# Patient Record
Sex: FEMALE | Race: OTHER | HISPANIC OR LATINO | URBAN - METROPOLITAN AREA
[De-identification: names, ages, dates, MRNs, and addresses within clinical notes are randomized per-mention and may not be internally consistent; named-entity substitution may affect disease eponyms.]

---

## 2021-02-27 VITALS
HEIGHT: 67 IN | WEIGHT: 182.1 LBS | RESPIRATION RATE: 18 BRPM | TEMPERATURE: 98 F | HEART RATE: 97 BPM | SYSTOLIC BLOOD PRESSURE: 153 MMHG | DIASTOLIC BLOOD PRESSURE: 90 MMHG | OXYGEN SATURATION: 100 %

## 2021-02-27 LAB
BASOPHILS # BLD AUTO: 0.01 K/UL — SIGNIFICANT CHANGE UP (ref 0–0.2)
BASOPHILS NFR BLD AUTO: 0.2 % — SIGNIFICANT CHANGE UP (ref 0–2)
EOSINOPHIL # BLD AUTO: 0.16 K/UL — SIGNIFICANT CHANGE UP (ref 0–0.5)
EOSINOPHIL NFR BLD AUTO: 3.1 % — SIGNIFICANT CHANGE UP (ref 0–6)
HCT VFR BLD CALC: 40.9 % — SIGNIFICANT CHANGE UP (ref 34.5–45)
HGB BLD-MCNC: 13.3 G/DL — SIGNIFICANT CHANGE UP (ref 11.5–15.5)
IMM GRANULOCYTES NFR BLD AUTO: 0.2 % — SIGNIFICANT CHANGE UP (ref 0–1.5)
LYMPHOCYTES # BLD AUTO: 1.85 K/UL — SIGNIFICANT CHANGE UP (ref 1–3.3)
LYMPHOCYTES # BLD AUTO: 36.3 % — SIGNIFICANT CHANGE UP (ref 13–44)
MCHC RBC-ENTMCNC: 29.7 PG — SIGNIFICANT CHANGE UP (ref 27–34)
MCHC RBC-ENTMCNC: 32.5 GM/DL — SIGNIFICANT CHANGE UP (ref 32–36)
MCV RBC AUTO: 91.3 FL — SIGNIFICANT CHANGE UP (ref 80–100)
MONOCYTES # BLD AUTO: 0.55 K/UL — SIGNIFICANT CHANGE UP (ref 0–0.9)
MONOCYTES NFR BLD AUTO: 10.8 % — SIGNIFICANT CHANGE UP (ref 2–14)
NEUTROPHILS # BLD AUTO: 2.51 K/UL — SIGNIFICANT CHANGE UP (ref 1.8–7.4)
NEUTROPHILS NFR BLD AUTO: 49.4 % — SIGNIFICANT CHANGE UP (ref 43–77)
NRBC # BLD: 0 /100 WBCS — SIGNIFICANT CHANGE UP (ref 0–0)
PLATELET # BLD AUTO: 306 K/UL — SIGNIFICANT CHANGE UP (ref 150–400)
RBC # BLD: 4.48 M/UL — SIGNIFICANT CHANGE UP (ref 3.8–5.2)
RBC # FLD: 13.1 % — SIGNIFICANT CHANGE UP (ref 10.3–14.5)
WBC # BLD: 5.09 K/UL — SIGNIFICANT CHANGE UP (ref 3.8–10.5)
WBC # FLD AUTO: 5.09 K/UL — SIGNIFICANT CHANGE UP (ref 3.8–10.5)

## 2021-02-27 NOTE — ED ADULT TRIAGE NOTE - OTHER COMPLAINTS
CC of lightheadedness x yesterday. pt was lying on the bed and tried to sit then felt lightheaded. yesterday /89, prior to /98

## 2021-02-28 ENCOUNTER — INPATIENT (INPATIENT)
Facility: HOSPITAL | Age: 71
LOS: 1 days | Discharge: HOME | DRG: 310 | End: 2021-03-02
Attending: INTERNAL MEDICINE | Admitting: INTERNAL MEDICINE
Payer: MEDICARE

## 2021-02-28 DIAGNOSIS — E11.9 TYPE 2 DIABETES MELLITUS WITHOUT COMPLICATIONS: ICD-10-CM

## 2021-02-28 DIAGNOSIS — Z90.710 ACQUIRED ABSENCE OF BOTH CERVIX AND UTERUS: Chronic | ICD-10-CM

## 2021-02-28 DIAGNOSIS — E78.5 HYPERLIPIDEMIA, UNSPECIFIED: ICD-10-CM

## 2021-02-28 DIAGNOSIS — Z96.651 PRESENCE OF RIGHT ARTIFICIAL KNEE JOINT: Chronic | ICD-10-CM

## 2021-02-28 DIAGNOSIS — I48.91 UNSPECIFIED ATRIAL FIBRILLATION: ICD-10-CM

## 2021-02-28 LAB
ALBUMIN SERPL ELPH-MCNC: 4.4 G/DL — SIGNIFICANT CHANGE UP (ref 3.3–5)
ALP SERPL-CCNC: 87 U/L — SIGNIFICANT CHANGE UP (ref 40–120)
ALT FLD-CCNC: 19 U/L — SIGNIFICANT CHANGE UP (ref 10–45)
ANION GAP SERPL CALC-SCNC: 10 MMOL/L — SIGNIFICANT CHANGE UP (ref 5–17)
ANION GAP SERPL CALC-SCNC: 9 MMOL/L — SIGNIFICANT CHANGE UP (ref 5–17)
APTT BLD: 34.6 SEC — SIGNIFICANT CHANGE UP (ref 27.5–35.5)
AST SERPL-CCNC: 23 U/L — SIGNIFICANT CHANGE UP (ref 10–40)
BILIRUB SERPL-MCNC: 0.3 MG/DL — SIGNIFICANT CHANGE UP (ref 0.2–1.2)
BUN SERPL-MCNC: 12 MG/DL — SIGNIFICANT CHANGE UP (ref 7–23)
BUN SERPL-MCNC: 13 MG/DL — SIGNIFICANT CHANGE UP (ref 7–23)
CALCIUM SERPL-MCNC: 10 MG/DL — SIGNIFICANT CHANGE UP (ref 8.4–10.5)
CALCIUM SERPL-MCNC: 11 MG/DL — HIGH (ref 8.4–10.5)
CHLORIDE SERPL-SCNC: 105 MMOL/L — SIGNIFICANT CHANGE UP (ref 96–108)
CHLORIDE SERPL-SCNC: 105 MMOL/L — SIGNIFICANT CHANGE UP (ref 96–108)
CO2 SERPL-SCNC: 24 MMOL/L — SIGNIFICANT CHANGE UP (ref 22–31)
CO2 SERPL-SCNC: 27 MMOL/L — SIGNIFICANT CHANGE UP (ref 22–31)
CREAT SERPL-MCNC: 0.7 MG/DL — SIGNIFICANT CHANGE UP (ref 0.5–1.3)
CREAT SERPL-MCNC: 0.77 MG/DL — SIGNIFICANT CHANGE UP (ref 0.5–1.3)
GLUCOSE BLDC GLUCOMTR-MCNC: 107 MG/DL — HIGH (ref 70–99)
GLUCOSE BLDC GLUCOMTR-MCNC: 112 MG/DL — HIGH (ref 70–99)
GLUCOSE BLDC GLUCOMTR-MCNC: 132 MG/DL — HIGH (ref 70–99)
GLUCOSE SERPL-MCNC: 114 MG/DL — HIGH (ref 70–99)
GLUCOSE SERPL-MCNC: 163 MG/DL — HIGH (ref 70–99)
HCT VFR BLD CALC: 40.3 % — SIGNIFICANT CHANGE UP (ref 34.5–45)
HCV AB S/CO SERPL IA: 0.11 S/CO — SIGNIFICANT CHANGE UP
HCV AB SERPL-IMP: SIGNIFICANT CHANGE UP
HGB BLD-MCNC: 13.3 G/DL — SIGNIFICANT CHANGE UP (ref 11.5–15.5)
INR BLD: 1.31 — HIGH (ref 0.88–1.16)
MAGNESIUM SERPL-MCNC: 1.6 MG/DL — SIGNIFICANT CHANGE UP (ref 1.6–2.6)
MCHC RBC-ENTMCNC: 29.4 PG — SIGNIFICANT CHANGE UP (ref 27–34)
MCHC RBC-ENTMCNC: 33 GM/DL — SIGNIFICANT CHANGE UP (ref 32–36)
MCV RBC AUTO: 89.2 FL — SIGNIFICANT CHANGE UP (ref 80–100)
NRBC # BLD: 0 /100 WBCS — SIGNIFICANT CHANGE UP (ref 0–0)
PLATELET # BLD AUTO: 211 K/UL — SIGNIFICANT CHANGE UP (ref 150–400)
POTASSIUM SERPL-MCNC: 3.8 MMOL/L — SIGNIFICANT CHANGE UP (ref 3.5–5.3)
POTASSIUM SERPL-MCNC: 4.1 MMOL/L — SIGNIFICANT CHANGE UP (ref 3.5–5.3)
POTASSIUM SERPL-SCNC: 3.8 MMOL/L — SIGNIFICANT CHANGE UP (ref 3.5–5.3)
POTASSIUM SERPL-SCNC: 4.1 MMOL/L — SIGNIFICANT CHANGE UP (ref 3.5–5.3)
PROT SERPL-MCNC: 7.3 G/DL — SIGNIFICANT CHANGE UP (ref 6–8.3)
PROTHROM AB SERPL-ACNC: 15.5 SEC — HIGH (ref 10.6–13.6)
RBC # BLD: 4.52 M/UL — SIGNIFICANT CHANGE UP (ref 3.8–5.2)
RBC # FLD: 13.3 % — SIGNIFICANT CHANGE UP (ref 10.3–14.5)
SARS-COV-2 IGG SERPL QL IA: NEGATIVE — SIGNIFICANT CHANGE UP
SARS-COV-2 IGM SERPL IA-ACNC: 0.07 INDEX — SIGNIFICANT CHANGE UP
SARS-COV-2 RNA SPEC QL NAA+PROBE: SIGNIFICANT CHANGE UP
SODIUM SERPL-SCNC: 138 MMOL/L — SIGNIFICANT CHANGE UP (ref 135–145)
SODIUM SERPL-SCNC: 142 MMOL/L — SIGNIFICANT CHANGE UP (ref 135–145)
TROPONIN T SERPL-MCNC: <0.01 NG/ML — SIGNIFICANT CHANGE UP (ref 0–0.01)
TROPONIN T SERPL-MCNC: <0.01 NG/ML — SIGNIFICANT CHANGE UP (ref 0–0.01)
WBC # BLD: 4.63 K/UL — SIGNIFICANT CHANGE UP (ref 3.8–10.5)
WBC # FLD AUTO: 4.63 K/UL — SIGNIFICANT CHANGE UP (ref 3.8–10.5)

## 2021-02-28 PROCEDURE — 71045 X-RAY EXAM CHEST 1 VIEW: CPT | Mod: 26

## 2021-02-28 PROCEDURE — 99222 1ST HOSP IP/OBS MODERATE 55: CPT

## 2021-02-28 PROCEDURE — 70450 CT HEAD/BRAIN W/O DYE: CPT | Mod: 26,MA

## 2021-02-28 PROCEDURE — 99291 CRITICAL CARE FIRST HOUR: CPT

## 2021-02-28 PROCEDURE — 93010 ELECTROCARDIOGRAM REPORT: CPT

## 2021-02-28 RX ORDER — ATORVASTATIN CALCIUM 80 MG/1
1 TABLET, FILM COATED ORAL
Qty: 0 | Refills: 0 | DISCHARGE

## 2021-02-28 RX ORDER — APIXABAN 2.5 MG/1
1 TABLET, FILM COATED ORAL
Qty: 30 | Refills: 2
Start: 2021-02-28 | End: 2021-05-28

## 2021-02-28 RX ORDER — SODIUM CHLORIDE 9 MG/ML
1000 INJECTION, SOLUTION INTRAVENOUS
Refills: 0 | Status: DISCONTINUED | OUTPATIENT
Start: 2021-02-28 | End: 2021-03-02

## 2021-02-28 RX ORDER — METOPROLOL TARTRATE 50 MG
12.5 TABLET ORAL
Refills: 0 | Status: DISCONTINUED | OUTPATIENT
Start: 2021-02-28 | End: 2021-02-28

## 2021-02-28 RX ORDER — APIXABAN 2.5 MG/1
5 TABLET, FILM COATED ORAL EVERY 12 HOURS
Refills: 0 | Status: DISCONTINUED | OUTPATIENT
Start: 2021-02-28 | End: 2021-03-02

## 2021-02-28 RX ORDER — DEXTROSE 50 % IN WATER 50 %
12.5 SYRINGE (ML) INTRAVENOUS ONCE
Refills: 0 | Status: DISCONTINUED | OUTPATIENT
Start: 2021-02-28 | End: 2021-03-02

## 2021-02-28 RX ORDER — DEXTROSE 50 % IN WATER 50 %
25 SYRINGE (ML) INTRAVENOUS ONCE
Refills: 0 | Status: DISCONTINUED | OUTPATIENT
Start: 2021-02-28 | End: 2021-03-02

## 2021-02-28 RX ORDER — MAGNESIUM SULFATE 500 MG/ML
2 VIAL (ML) INJECTION ONCE
Refills: 0 | Status: COMPLETED | OUTPATIENT
Start: 2021-02-28 | End: 2021-02-28

## 2021-02-28 RX ORDER — GLUCAGON INJECTION, SOLUTION 0.5 MG/.1ML
1 INJECTION, SOLUTION SUBCUTANEOUS ONCE
Refills: 0 | Status: DISCONTINUED | OUTPATIENT
Start: 2021-02-28 | End: 2021-03-02

## 2021-02-28 RX ORDER — APIXABAN 2.5 MG/1
5 TABLET, FILM COATED ORAL EVERY 12 HOURS
Refills: 0 | Status: DISCONTINUED | OUTPATIENT
Start: 2021-02-28 | End: 2021-02-28

## 2021-02-28 RX ORDER — METOPROLOL TARTRATE 50 MG
12.5 TABLET ORAL ONCE
Refills: 0 | Status: COMPLETED | OUTPATIENT
Start: 2021-02-28 | End: 2021-02-28

## 2021-02-28 RX ORDER — METOPROLOL TARTRATE 50 MG
5 TABLET ORAL ONCE
Refills: 0 | Status: COMPLETED | OUTPATIENT
Start: 2021-02-28 | End: 2021-02-28

## 2021-02-28 RX ORDER — ATORVASTATIN CALCIUM 80 MG/1
40 TABLET, FILM COATED ORAL AT BEDTIME
Refills: 0 | Status: DISCONTINUED | OUTPATIENT
Start: 2021-02-28 | End: 2021-03-02

## 2021-02-28 RX ORDER — DEXTROSE 50 % IN WATER 50 %
15 SYRINGE (ML) INTRAVENOUS ONCE
Refills: 0 | Status: DISCONTINUED | OUTPATIENT
Start: 2021-02-28 | End: 2021-03-02

## 2021-02-28 RX ORDER — DILTIAZEM HCL 120 MG
20 CAPSULE, EXT RELEASE 24 HR ORAL ONCE
Refills: 0 | Status: DISCONTINUED | OUTPATIENT
Start: 2021-02-28 | End: 2021-02-28

## 2021-02-28 RX ORDER — METOPROLOL TARTRATE 50 MG
25 TABLET ORAL
Refills: 0 | Status: DISCONTINUED | OUTPATIENT
Start: 2021-02-28 | End: 2021-03-01

## 2021-02-28 RX ORDER — POTASSIUM CHLORIDE 20 MEQ
20 PACKET (EA) ORAL ONCE
Refills: 0 | Status: COMPLETED | OUTPATIENT
Start: 2021-02-28 | End: 2021-02-28

## 2021-02-28 RX ORDER — APIXABAN 2.5 MG/1
5 TABLET, FILM COATED ORAL ONCE
Refills: 0 | Status: COMPLETED | OUTPATIENT
Start: 2021-02-28 | End: 2021-02-28

## 2021-02-28 RX ORDER — METFORMIN HYDROCHLORIDE 850 MG/1
1 TABLET ORAL
Qty: 0 | Refills: 0 | DISCHARGE

## 2021-02-28 RX ORDER — INSULIN LISPRO 100/ML
VIAL (ML) SUBCUTANEOUS
Refills: 0 | Status: DISCONTINUED | OUTPATIENT
Start: 2021-02-28 | End: 2021-03-02

## 2021-02-28 RX ADMIN — APIXABAN 5 MILLIGRAM(S): 2.5 TABLET, FILM COATED ORAL at 14:02

## 2021-02-28 RX ADMIN — Medication 12.5 MILLIGRAM(S): at 11:01

## 2021-02-28 RX ADMIN — ATORVASTATIN CALCIUM 40 MILLIGRAM(S): 80 TABLET, FILM COATED ORAL at 21:06

## 2021-02-28 RX ADMIN — Medication 50 GRAM(S): at 12:10

## 2021-02-28 RX ADMIN — Medication 5 MILLIGRAM(S): at 00:47

## 2021-02-28 RX ADMIN — APIXABAN 5 MILLIGRAM(S): 2.5 TABLET, FILM COATED ORAL at 02:17

## 2021-02-28 RX ADMIN — Medication 20 MILLIEQUIVALENT(S): at 01:27

## 2021-02-28 RX ADMIN — Medication 25 MILLIGRAM(S): at 17:23

## 2021-02-28 RX ADMIN — Medication 12.5 MILLIGRAM(S): at 06:18

## 2021-02-28 NOTE — ED PROVIDER NOTE - CLINICAL SUMMARY MEDICAL DECISION MAKING FREE TEXT BOX
Pt c/o dizziness yest (now resolved), intermittent palpitation sensation, sob ~ 9p w/o cp.  Pt w new afib.  Neuro exam nl, low concern for cva but + afib, dizziness w/o neuro deficits.

## 2021-02-28 NOTE — ED PROVIDER NOTE - NEUROLOGICAL, MLM
awake, alert, oriented x 3, CN II-XII grossly intact, motor 5/5, no gross sens deficits,  no ataxia, speech clear.

## 2021-02-28 NOTE — H&P ADULT - NSHPLABSRESULTS_GEN_ALL_CORE
13.3   5.09  )-----------( 306      ( 27 Feb 2021 23:43 )             40.9       02-27    142  |  105  |  13  ----------------------------<  114<H>  3.8   |  27  |  0.70    Ca    11.0<H>      27 Feb 2021 23:43    TPro  7.3  /  Alb  4.4  /  TBili  0.3  /  DBili  x   /  AST  23  /  ALT  19  /  AlkPhos  87  02-27          CARDIAC MARKERS ( 27 Feb 2021 23:43 )  x     / <0.01 ng/mL / x     / x     / x                EKG:

## 2021-02-28 NOTE — H&P ADULT - PROBLEM SELECTOR PLAN 3
Total 110, try 82, HDL 44, LDL 50  -continue home....      DVT PPX: Eliquis   Dispo: pending clinical progression Total 110, try 82, HDL 44, LDL 50  -continue home atorvastatin 40 mg QHS       DVT PPX: Eliquis   Dispo: pending clinical progression

## 2021-02-28 NOTE — H&P ADULT - ATTENDING COMMENTS
Initial attending contact date 2/28/21. See attending addendum to HPI for initial attending contact documentation. See PA note written above, for details. I reviewed the PA documentation.  I have personally seen and examined this patient today. I reviewed vitals, labs, medications, cardiac studies and additional imaging.  I agree with the PA's findings and plans as written above with the following additions/amendments:  patient with new onset Afib, HLD, DM  Plan for:  Eliquis 5 BID for new onset Afib  Send rx to [facility] to check coverage  High intensity statin Atorva 40mg qHS   Metoprolol 25 BID for rate control  -->will transition to XL upon discharge  Obtain TTE for cardiac structural assessment  EP consult to feroz for DCCV   NPO pMN tonight in anticipation for potential EP procedure (e.g. SARA/DCCV)  CARLITOS Harris.  Cardiology Attending

## 2021-02-28 NOTE — H&P ADULT - NSICDXPASTMEDICALHX_GEN_ALL_CORE_FT
PAST MEDICAL HISTORY:  Diabetes     HLD (hyperlipidemia)      PAST MEDICAL HISTORY:  Diabetes     HLD (hyperlipidemia)     Thyroid nodule

## 2021-02-28 NOTE — ED PROVIDER NOTE - OBJECTIVE STATEMENT
71 yo female h/o hld, dm c/o episode of dizziness (lh sensation) yest that resolved, bp 150's yest that was then 130's on repeat, bp 200's earlier today that also improved, palpitation sensation earlier today that resolved, sob starting ~ 9pm tonight that's now improved.  No cp.  No current palpitations, sob, dizziness.  No h/o cad, arrhythmia.  No ha, change in vision/speech/gait, numbness or weakness in ext   No fever, uri sx.

## 2021-02-28 NOTE — ED PROVIDER NOTE - PROGRESS NOTE DETAILS
Pt discussed w cardiology fellow and pa.  Agrees w admission.  Cardiology prefers metoprolol for rate control. ER Physician:  Ashlie Director  CHEST XRAY INTERPRETATION: lungs clear, heart shadow normal, bony structures intact

## 2021-02-28 NOTE — H&P ADULT - ASSESSMENT
70 Y F with PMH HLD, DM II who presents to ED 2/28/21 for an episode of palpitations, dizziness and SOB at 9pm Friday when in bed. Pt took an aspirin 81 mg x1 and symptoms resolved after a few minutes.  EKG: afib @ 116 bpm.  s/p lopressor 5 mg IV x1 with resulting HR 80-90's and Eliquis 5 mg po X1.  Pt admitted to cardiac tele for management of new afib.

## 2021-02-28 NOTE — ED ADULT NURSE NOTE - OBJECTIVE STATEMENT
71 y/o female w/ PMH DMII and HLD, presents to ED for c/o intermittent dizziness and episodes of high blood pressure since yesterday, highest reading 200s. Pt also reports associated chest palpitations 71 y/o female w/ PMH DMII and HLD, presents to ED for c/o intermittent dizziness and episodes of high blood pressure since yesterday, highest reading 200s. Pt also reports associated chest palpitations and SOB that began this evening, ultimately the reason why pt presented to ED. Denies CP on arrival, reports mild associated headaches. Denies cough/fevers/chills/difficulty breathing. NAD on arrival, afib on EKG

## 2021-03-01 ENCOUNTER — TRANSCRIPTION ENCOUNTER (OUTPATIENT)
Age: 71
End: 2021-03-01

## 2021-03-01 LAB
ANION GAP SERPL CALC-SCNC: 8 MMOL/L — SIGNIFICANT CHANGE UP (ref 5–17)
APTT BLD: 33.5 SEC — SIGNIFICANT CHANGE UP (ref 27.5–35.5)
BUN SERPL-MCNC: 17 MG/DL — SIGNIFICANT CHANGE UP (ref 7–23)
CALCIUM SERPL-MCNC: 9.4 MG/DL — SIGNIFICANT CHANGE UP (ref 8.4–10.5)
CHLORIDE SERPL-SCNC: 107 MMOL/L — SIGNIFICANT CHANGE UP (ref 96–108)
CO2 SERPL-SCNC: 28 MMOL/L — SIGNIFICANT CHANGE UP (ref 22–31)
CREAT SERPL-MCNC: 0.91 MG/DL — SIGNIFICANT CHANGE UP (ref 0.5–1.3)
GLUCOSE BLDC GLUCOMTR-MCNC: 121 MG/DL — HIGH (ref 70–99)
GLUCOSE BLDC GLUCOMTR-MCNC: 133 MG/DL — HIGH (ref 70–99)
GLUCOSE BLDC GLUCOMTR-MCNC: 143 MG/DL — HIGH (ref 70–99)
GLUCOSE BLDC GLUCOMTR-MCNC: 153 MG/DL — HIGH (ref 70–99)
GLUCOSE BLDC GLUCOMTR-MCNC: 173 MG/DL — HIGH (ref 70–99)
GLUCOSE SERPL-MCNC: 123 MG/DL — HIGH (ref 70–99)
HCT VFR BLD CALC: 42.5 % — SIGNIFICANT CHANGE UP (ref 34.5–45)
HGB BLD-MCNC: 13.7 G/DL — SIGNIFICANT CHANGE UP (ref 11.5–15.5)
INR BLD: 1.48 — HIGH (ref 0.88–1.16)
MAGNESIUM SERPL-MCNC: 2 MG/DL — SIGNIFICANT CHANGE UP (ref 1.6–2.6)
MCHC RBC-ENTMCNC: 29.6 PG — SIGNIFICANT CHANGE UP (ref 27–34)
MCHC RBC-ENTMCNC: 32.2 GM/DL — SIGNIFICANT CHANGE UP (ref 32–36)
MCV RBC AUTO: 91.8 FL — SIGNIFICANT CHANGE UP (ref 80–100)
NRBC # BLD: 0 /100 WBCS — SIGNIFICANT CHANGE UP (ref 0–0)
PLATELET # BLD AUTO: 333 K/UL — SIGNIFICANT CHANGE UP (ref 150–400)
POTASSIUM SERPL-MCNC: 4.6 MMOL/L — SIGNIFICANT CHANGE UP (ref 3.5–5.3)
POTASSIUM SERPL-SCNC: 4.6 MMOL/L — SIGNIFICANT CHANGE UP (ref 3.5–5.3)
PROTHROM AB SERPL-ACNC: 17.4 SEC — HIGH (ref 10.6–13.6)
RBC # BLD: 4.63 M/UL — SIGNIFICANT CHANGE UP (ref 3.8–5.2)
RBC # FLD: 13.3 % — SIGNIFICANT CHANGE UP (ref 10.3–14.5)
SODIUM SERPL-SCNC: 143 MMOL/L — SIGNIFICANT CHANGE UP (ref 135–145)
WBC # BLD: 4.5 K/UL — SIGNIFICANT CHANGE UP (ref 3.8–10.5)
WBC # FLD AUTO: 4.5 K/UL — SIGNIFICANT CHANGE UP (ref 3.8–10.5)

## 2021-03-01 PROCEDURE — 99223 1ST HOSP IP/OBS HIGH 75: CPT

## 2021-03-01 PROCEDURE — 92960 CARDIOVERSION ELECTRIC EXT: CPT

## 2021-03-01 PROCEDURE — 93312 ECHO TRANSESOPHAGEAL: CPT | Mod: 26

## 2021-03-01 PROCEDURE — 99233 SBSQ HOSP IP/OBS HIGH 50: CPT

## 2021-03-01 RX ORDER — METOPROLOL TARTRATE 50 MG
50 TABLET ORAL DAILY
Refills: 0 | Status: DISCONTINUED | OUTPATIENT
Start: 2021-03-01 | End: 2021-03-01

## 2021-03-01 RX ORDER — METOPROLOL TARTRATE 50 MG
50 TABLET ORAL DAILY
Refills: 0 | Status: DISCONTINUED | OUTPATIENT
Start: 2021-03-02 | End: 2021-03-02

## 2021-03-01 RX ORDER — APIXABAN 2.5 MG/1
1 TABLET, FILM COATED ORAL
Qty: 30 | Refills: 0
Start: 2021-03-01 | End: 2021-03-30

## 2021-03-01 RX ORDER — APIXABAN 2.5 MG/1
1 TABLET, FILM COATED ORAL
Qty: 60 | Refills: 0
Start: 2021-03-01 | End: 2021-03-30

## 2021-03-01 RX ORDER — METOPROLOL TARTRATE 50 MG
5 TABLET ORAL ONCE
Refills: 0 | Status: COMPLETED | OUTPATIENT
Start: 2021-03-01 | End: 2021-03-01

## 2021-03-01 RX ORDER — LANOLIN ALCOHOL/MO/W.PET/CERES
5 CREAM (GRAM) TOPICAL AT BEDTIME
Refills: 0 | Status: DISCONTINUED | OUTPATIENT
Start: 2021-03-01 | End: 2021-03-02

## 2021-03-01 RX ORDER — METOPROLOL TARTRATE 50 MG
25 TABLET ORAL ONCE
Refills: 0 | Status: COMPLETED | OUTPATIENT
Start: 2021-03-01 | End: 2021-03-01

## 2021-03-01 RX ADMIN — Medication 25 MILLIGRAM(S): at 21:37

## 2021-03-01 RX ADMIN — Medication 25 MILLIGRAM(S): at 15:34

## 2021-03-01 RX ADMIN — APIXABAN 5 MILLIGRAM(S): 2.5 TABLET, FILM COATED ORAL at 01:01

## 2021-03-01 RX ADMIN — Medication 5 MILLIGRAM(S): at 21:37

## 2021-03-01 RX ADMIN — APIXABAN 5 MILLIGRAM(S): 2.5 TABLET, FILM COATED ORAL at 15:34

## 2021-03-01 RX ADMIN — Medication 1: at 21:37

## 2021-03-01 RX ADMIN — ATORVASTATIN CALCIUM 40 MILLIGRAM(S): 80 TABLET, FILM COATED ORAL at 21:38

## 2021-03-01 NOTE — DISCHARGE NOTE PROVIDER - NSDCCPTREATMENT_GEN_ALL_CORE_FT
PRINCIPAL PROCEDURE  Procedure: 2D echocardiography  Findings and Treatment: CONCLUSIONS:   1. Normal left and right ventricular size and systolic function.   2. No LA/RA/WAI/RAA thrombus seen.   3. Mild mitral regurgitation.   4. The aortic valve is tricuspid and mildly calcified. There is mild aortic regurgitation.   5. No pericardial effusion.   6. There is mild non-mobile plaque seen in the visualized portions of the descending aorta and aortic arch.

## 2021-03-01 NOTE — DISCHARGE NOTE PROVIDER - CARE PROVIDER_API CALL
Anant Isidro (MD)  Cardiac Electrophysiology; Cardiovascular Disease; Internal Medicine  [address] [place] [address]  Phone: [phone]  Fax: [phone]  Follow Up Time: 1 month   Anant Isidro (MD)  Cardiac Electrophysiology; Cardiovascular Disease; Internal Medicine  [address] [place] [address], [address] [place] [address]  Phone: [phone]  Fax: [phone]  Follow Up Time: 1 month    Inocente Rangel)  Cardiology  [address]   Phone: [phone]  Fax: [phone]  Follow Up Time: 2 weeks   Anant Isidro (MD)  Cardiac Electrophysiology; Cardiovascular Disease; Internal Medicine  [address], [address] [place] [address]  Phone: [phone]  Fax: [phone]  Scheduled Appointment: 04/29/2021 03:40 PM    Inocente Rangel)  Cardiology  [address]   Phone: [phone]  Fax: [phone]  Follow Up Time: 2 weeks

## 2021-03-01 NOTE — PROGRESS NOTE ADULT - ASSESSMENT
70 Y F with PMH HLD, DM II who presents to ED 2/28/21 for an episode of palpitations, dizziness and SOB at 9pm Friday when in bed. Pt took an aspirin 81 mg x1 and symptoms resolved after a few minutes.  EKG: afib @ 116 bpm.  s/p lopressor 5 mg IV x1 with resulting HR 80-90's and Eliquis 5 mg po X1.  Pt admitted to cardiac tele for management of new afib. 
70 Y F with PMH HLD, DM II who presents to ED 2/28/21 for episodes of palpitations, dizziness and SOB at 9pm Friday when in bed. Pt took an aspirin 81 mg x1 and symptoms resolved after a few minutes. Found to have new onset Afib RVR w/  bpm, started on Eliquis and BB. Pt admitted to cardiac tele for management of new onset Afib, EP consulted s/p SARA/DCCV to NSR.

## 2021-03-01 NOTE — DISCHARGE NOTE PROVIDER - NSDCFUSCHEDAPPT_GEN_ALL_CORE_FT
YASMANY MINER ; 04/29/2021 ; [facility] [address]  YASMANY MINER ; 05/06/2021 ; [facility] [address]

## 2021-03-01 NOTE — CONSULT NOTE ADULT - ASSESSMENT
Sharee Maldonado is a 71 yo F with hx of HTN, HLD, DM who presented with palpitations, dizziness on 2/27 found to be in Atrial fibrillation, Patient started on Elqiuis and metoprolol. EP consulted for cardioversion.    Atrial fibrillation  - continue metoprolol 25mg BID  - continue Eliquis 5mg q 12  - keep NPO, obtain SARA to rule out Thrombus  - obtain TTE to evaluate function  - plan for DCCV once cleared by SARA  - consent obtained.

## 2021-03-01 NOTE — PROGRESS NOTE ADULT - PROBLEM SELECTOR PLAN 1
New onset Afib, -120's  -Transition Lopressor to Toprol 50mg qd  -Started Eliquis 5 mg BID ($4 copay per pharmacy)  -TSH WNL  -SARA revealed EF 65%, mild AI/MR

## 2021-03-01 NOTE — PROGRESS NOTE ADULT - PROBLEM SELECTOR PLAN 3
Total 110, try 82, HDL 44, LDL 50  -continue home atorvastatin 40 mg QHS     DVT PPX: Eliquis   Dispo: No escort home today s/p DCCV. Plan for discharge home tomorrow, friend to

## 2021-03-01 NOTE — DISCHARGE NOTE PROVIDER - NSDCCPCAREPLAN_GEN_ALL_CORE_FT
PRINCIPAL DISCHARGE DIAGNOSIS  Diagnosis: Atrial fibrillation, new onset  Assessment and Plan of Treatment: Atrial fibrillation, new onset       PRINCIPAL DISCHARGE DIAGNOSIS  Diagnosis: Atrial fibrillation, new onset  Assessment and Plan of Treatment: You came into the hospital and was found to have with an irregular heart rhythm called new onset Atrial Fibrillation. You were given medication Metoprolol to help decrease your fast heart rates associated with the atrial fibrillation. You were seen by the Heart Rhythm Specialist (Electrophysiologist) and received a SARA and Cardioversion procedure and was converted into a normal heart rhythm. You were started on blood thinner medication Eliquis for which you will need to take for at least 1 month after the cardioversion procedure to prevent risk of stroke. Please follow up with Dr Isidro in 1 month after hospital discharge.

## 2021-03-01 NOTE — PROGRESS NOTE ADULT - SUBJECTIVE AND OBJECTIVE BOX
*** IN PROGRESS / INCOMPLETE NOTE ***    Interventional Cardiology PA Adult Progress Note    Subjective Assessment:  	  MEDICATIONS:  metoprolol tartrate 12.5 milliGRAM(s) Oral two times a day  atorvastatin 40 milliGRAM(s) Oral at bedtime  dextrose 40% Gel 15 Gram(s) Oral once  dextrose 50% Injectable 25 Gram(s) IV Push once  dextrose 50% Injectable 12.5 Gram(s) IV Push once  dextrose 50% Injectable 25 Gram(s) IV Push once  glucagon  Injectable 1 milliGRAM(s) IntraMuscular once  insulin lispro (ADMELOG) corrective regimen sliding scale   SubCutaneous three times a day before meals  apixaban 5 milliGRAM(s) Oral every 12 hours  dextrose 5%. 1000 milliLiter(s) IV Continuous <Continuous>  dextrose 5%. 1000 milliLiter(s) IV Continuous <Continuous>      	    [PHYSICAL EXAM:  TELEMETRY:  T(C): 37 (02-28-21 @ 04:48), Max: 37 (02-28-21 @ 04:48)  HR: 96 (02-28-21 @ 05:36) (82 - 132)  BP: 123/72 (02-28-21 @ 05:36) (123/68 - 153/90)  RR: 16 (02-28-21 @ 05:36) (16 - 20)  SpO2: 100% (02-28-21 @ 05:36) (100% - 100%)  Wt(kg): --  I&O's Summary    Height (cm): 170.2 (02-28 @ 05:36)  Weight (kg): 82.6 (02-28 @ 05:36)  BMI (kg/m2): 28.5 (02-28 @ 05:36)  BSA (m2): 1.94 (02-28 @ 05:36)  Melgoza:  Central/PICC/Mid Line:                                         Appearance: Normal	  HEENT:   Normal oral mucosa, PERRL, EOMI	  Neck: Supple, + JVD/ - JVD; Carotid Bruit   Cardiovascular: Normal S1 S2, No JVD, No murmurs,   Respiratory: Lungs clear to auscultation/Decreased Breath Sounds/No Rales, Rhonchi, Wheezing	  Gastrointestinal:  Soft, Non-tender, + BS	  Skin: No rashes, No ecchymoses, No cyanosis  Extremities: Normal range of motion, No clubbing, cyanosis or edema  Vascular: Peripheral pulses palpable 2+ bilaterally  Neurologic: Non-focal  Psychiatry: A & O x 3, Mood & affect appropriate      	    ECG:  	  RADIOLOGY:   DIAGNOSTIC TESTING:  [ ] Echocardiogram:  [ ]  Catheterization:  [ ] Stress Test:    [ ] SARA  OTHER: 	    LABS:	 	  CARDIAC MARKERS:                        13.3   5.09  )-----------( 306      ( 27 Feb 2021 23:43 )             40.9     142  |  105  |  13  ----------------------------<  114<H>  3.8   |  27  |  0.70    Ca    11.0<H>      27 Feb 2021 23:43    TPro  7.3  /  Alb  4.4  /  TBili  0.3  /  DBili  x   /  AST  23  /  ALT  19  /  AlkPhos  87  02-27    proBNP:   Lipid Profile:   HgA1c:   TSH: Thyroid Stimulating Hormone, Serum: 1.487 uIU/mL (02-27 @ 23:43)    PT/INR - ( 28 Feb 2021 06:03 )   PT: 15.5 sec;   INR: 1.31          PTT - ( 28 Feb 2021 06:03 )  PTT:34.6 sec    ASSESSMENT/PLAN: 	        DVT ppx:  Dispo:    
CARDIOLOGY NP PROGRESS NOTE    Subjective: Pt seen and examined at bedside. Reports feeling much better after DCCV to NSR today. Denies further chest tightness, chest pain, sob, lightheadedness, dizziness, palpitations. Remainder ROS otherwise negative.    Overnight Events: NPO s/p MN, s/p SARA/DCCV to NSR    TELEMETRY: Afib 90-100s converted to SR 70s      VITAL SIGNS:  T(C): 36.6 (03-01-21 @ 04:30), Max: 37.3 (02-28-21 @ 18:00)  HR: 93 (03-01-21 @ 08:30) (78 - 115)  BP: 112/72 (03-01-21 @ 08:30) (98/57 - 117/67)  RR: 16 (03-01-21 @ 08:30) (16 - 18)  SpO2: 99% (03-01-21 @ 08:30) (96% - 100%)  Wt(kg): --    I&O's Summary    28 Feb 2021 07:01  -  01 Mar 2021 07:00  --------------------------------------------------------  IN: 736 mL / OUT: 750 mL / NET: -14 mL          PHYSICAL EXAM:    General: A/ox 3, No acute Distress  Neck: Supple, NO JVD  Cardiac: S1 S2, No M/R/G  Pulmonary: CTAB, Breathing unlabored on RA, No Rhonchi/Rales/Wheezing  Abdomen: Soft, Non -tender, +BS x 4 quads  Extremities: No Rashes, No edema  Neuro: A/o x 3, No focal deficits          LABS:                          13.7   4.50  )-----------( 333      ( 01 Mar 2021 06:14 )             42.5                              03-01    143  |  107  |  17  ----------------------------<  123<H>  4.6   |  28  |  0.91    Ca    9.4      01 Mar 2021 06:14  Mg     2.0     03-01    TPro  7.3  /  Alb  4.4  /  TBili  0.3  /  DBili  x   /  AST  23  /  ALT  19  /  AlkPhos  87  02-27    LIVER FUNCTIONS - ( 27 Feb 2021 23:43 )  Alb: 4.4 g/dL / Pro: 7.3 g/dL / ALK PHOS: 87 U/L / ALT: 19 U/L / AST: 23 U/L / GGT: x         PT/INR - ( 01 Mar 2021 06:14 )   PT: 17.4 sec;   INR: 1.48          PTT - ( 01 Mar 2021 06:14 )  PTT:33.5 sec  CAPILLARY BLOOD GLUCOSE      POCT Blood Glucose.: 153 mg/dL (01 Mar 2021 14:52)  POCT Blood Glucose.: 121 mg/dL (01 Mar 2021 09:30)  POCT Blood Glucose.: 143 mg/dL (01 Mar 2021 06:46)  POCT Blood Glucose.: 112 mg/dL (28 Feb 2021 17:00)    CARDIAC MARKERS ( 28 Feb 2021 10:09 )  x     / <0.01 ng/mL / x     / x     / x      CARDIAC MARKERS ( 27 Feb 2021 23:43 )  x     / <0.01 ng/mL / x     / x     / x              Allergies:  No Known Allergies    MEDICATIONS  (STANDING):  apixaban 5 milliGRAM(s) Oral every 12 hours  atorvastatin 40 milliGRAM(s) Oral at bedtime  dextrose 40% Gel 15 Gram(s) Oral once  dextrose 5%. 1000 milliLiter(s) (50 mL/Hr) IV Continuous <Continuous>  dextrose 5%. 1000 milliLiter(s) (100 mL/Hr) IV Continuous <Continuous>  dextrose 50% Injectable 25 Gram(s) IV Push once  dextrose 50% Injectable 12.5 Gram(s) IV Push once  dextrose 50% Injectable 25 Gram(s) IV Push once  glucagon  Injectable 1 milliGRAM(s) IntraMuscular once  insulin lispro (ADMELOG) corrective regimen sliding scale   SubCutaneous Before meals and at bedtime  metoprolol tartrate 25 milliGRAM(s) Oral two times a day    MEDICATIONS  (PRN):        DIAGNOSTIC TESTS:     < from: SARA Echo Doppler w/ Cont (03.01.21 @ 10:45) >  CONCLUSIONS:     1. Normal left and right ventricular size and systolic function.   2. No LA/RA/WAI/RAA thrombus seen.   3. Mild mitral regurgitation.   4. The aortic valve is tricuspid and mildly calcified. There is mild aortic regurgitation.   5. No pericardial effusion.   6. There is mild non-mobile plaque seen in the visualized portions of the descending aorta and aortic arch.    < end of copied text >

## 2021-03-01 NOTE — CONSULT NOTE ADULT - SUBJECTIVE AND OBJECTIVE BOX
Sharee Maldonado is a 71 yo F with hx of HTN, HLD, DMII who presented to ED on 2/27 with atrial fibrillation.  On Thursday 2/25 pt began to feel palpitations, nausea and contiued to have dizziness, SOB Friday evening.  Pt took an aspirin 81mg and symptoms started to resolved after a few minutes.  Pt noted blood pressure ranging 130-150s with high 200, which prompted pt to go to ED.   In ED EKG with  Afib, /90, sating 100% on RA, Covid negative, patient was given   5mg IV lopressor which lowered HR to 80-90s. Patient admitted to [address] [place] [address].  Started on metoprolol 25mg BID and eliquis 5mg q 12.    Patient remains in Afib, HR in 80-90s mostly, intermittently up to 120-140s.   BP stable in 110s.  Remains on RA.  Upon exam, patient complains of palpitations, but states that dizziness and nausea have improved.      PAST MEDICAL & SURGICAL HISTORY:  Thyroid nodule    Diabetes    HLD (hyperlipidemia)    H/O total hysterectomy    H/O total knee replacement, right    Appearance: Normal	  HEENT:   Normal oral mucosa  Neck: Supple, -JVD  Cardiovascular:  irregularly irregular   Respiratory: Lungs clear to auscultation  Gastrointestinal:  Soft, Non-tender, + BS	  Skin: No rashes, No ecchymoses, No cyanosis  Extremities: BLE warm, dry, no edema  Psychiatry: A & O x 3, Mood & affect appropriate    ICU Vital Signs Last 24 Hrs  T(C): 36.6 (01 Mar 2021 04:30), Max: 37.3 (28 Feb 2021 18:00)  T(F): 97.8 (01 Mar 2021 04:30), Max: 99.1 (28 Feb 2021 18:00)  HR: 93 (01 Mar 2021 08:30) (78 - 115)  BP: 112/72 (01 Mar 2021 08:30) (98/57 - 117/67)  BP(mean): 89 (01 Mar 2021 08:30) (71 - 89)  ABP: --  ABP(mean): --  RR: 16 (01 Mar 2021 08:30) (16 - 18)  SpO2: 99% (01 Mar 2021 08:30) (96% - 100%)    MEDICATIONS  (STANDING):  apixaban 5 milliGRAM(s) Oral every 12 hours  atorvastatin 40 milliGRAM(s) Oral at bedtime  dextrose 40% Gel 15 Gram(s) Oral once  dextrose 5%. 1000 milliLiter(s) (50 mL/Hr) IV Continuous <Continuous>  dextrose 5%. 1000 milliLiter(s) (100 mL/Hr) IV Continuous <Continuous>  dextrose 50% Injectable 25 Gram(s) IV Push once  dextrose 50% Injectable 12.5 Gram(s) IV Push once  dextrose 50% Injectable 25 Gram(s) IV Push once  glucagon  Injectable 1 milliGRAM(s) IntraMuscular once  insulin lispro (ADMELOG) corrective regimen sliding scale   SubCutaneous Before meals and at bedtime  metoprolol tartrate 25 milliGRAM(s) Oral two times a day    I&O's Detail    28 Feb 2021 07:01  -  01 Mar 2021 07:00  --------------------------------------------------------  IN:    IV PiggyBack: 100 mL    Oral Fluid: 636 mL  Total IN: 736 mL    OUT:    Voided (mL): 750 mL  Total OUT: 750 mL    Total NET: -14 mL

## 2021-03-01 NOTE — PROGRESS NOTE ADULT - ATTENDING COMMENTS
See NP note written above, for details. I reviewed the NP documentation.  I have personally seen and examined this patient today.  I reviewed vitals, labs, medications, cardiac studies and additional imaging.  I agree with the NP findings and plans as written above with the following additions/amendments:  patient with new onset Afib, HLD, DM. Patient now s/p SARA and successful DCCV to NSR with EP. LVEF 65%, no WAI thrombus.   Plan for:  Eliquis 5 BID for new onset Afib  -->$4/mo co pay per [facility]  High intensity statin Atorva 40mg qHS   Metoprolol 25 BID for rate control  -->will transition to 50XL upon discharge  Anticipate discharge 3/2 pending clinical stability post DCCV, friend will  at 12noon  CARLITOS Harris.  Cardiology Attending

## 2021-03-01 NOTE — DISCHARGE NOTE PROVIDER - HOSPITAL COURSE
70 Y F with PMH HLD, DM II who presents to ED 2/28/21 for episodes of palpitations, dizziness and SOB at 9pm Friday when in bed. Pt took an aspirin 81 mg x1 and symptoms resolved after a few minutes. Found to have new onset Afib RVR w/  bpm, started on Eliquis and BB. Pt admitted to cardiac tele for management of new onset Afib, EP consulted s/p SARA/DCCV to NSR. 70 Y F with PMH HLD, DM II who presents to ED 2/28/21 for few episodes of palpitations, dizziness and SOB at rest. Found to have new onset Afib RVR w/  bpm. Pt admitted to cardiac tele for management of new onset Afib. EP was consulted and SARA showed EF 65%, no thrombus, mild AI/MR. S/p successful DCCV on 3/1/21 to NSR 70bpm w/ resolution of symptoms. Pt was started on Eliquis 5mg BID ($4 copay per pharmacy), Lopressor 25mg BID was transitioned to PO Toprol 50mg qd.    On the day of discharge, the patient was seen and examined. Symptoms improved. Vital signs are stable. Labs and imaging reviewed. Patient is medically optimized and hemodynamically stable. Return precautions discussed, medication teach back done, and importance of physician followup emphasized. The patient verbalized understanding. 70 Y F with PMH HLD, DM II who presents to ED 2/28/21 for few episodes of palpitations, dizziness and SOB at rest for the last few days. Found to have new onset Afib RVR w/  bpm. Pt admitted to cardiac tele for management of new onset Afib. EP was consulted and SARA showed EF 65%, no thrombus, mild AI/MR. S/p successful DCCV on 3/1/21 to NSR 70bpm w/ resolution of symptoms. Pt was started on Eliquis 5mg BID ($4 copay per pharmacy), Lopressor 25mg BID was transitioned to PO Toprol 50mg qd.    On the day of discharge, the patient was seen and examined. Symptoms improved. Vital signs are stable. Labs and imaging reviewed. Patient is medically optimized and hemodynamically stable. Return precautions discussed, medication teach back done, and importance of physician followup emphasized. The patient verbalized understanding.

## 2021-03-01 NOTE — DISCHARGE NOTE PROVIDER - PROVIDER TOKENS
PROVIDER:[TOKEN:[[id number]],FOLLOWUP:[1 month]] PROVIDER:[TOKEN:[[id number]],FOLLOWUP:[1 month]],PROVIDER:[TOKEN:[[id number]],FOLLOWUP:[2 weeks]] PROVIDER:[TOKEN:[[id number]],SCHEDULEDAPPT:[04/29/2021],SCHEDULEDAPPTTIME:[03:40 PM]],PROVIDER:[TOKEN:[[id number]],FOLLOWUP:[2 weeks]]

## 2021-03-01 NOTE — DISCHARGE NOTE PROVIDER - CARE PROVIDERS DIRECT ADDRESSES
[email or web address]

## 2021-03-01 NOTE — DISCHARGE NOTE PROVIDER - NSDCMRMEDTOKEN_GEN_ALL_CORE_FT
apixaban 5 mg oral tablet: 1 tab(s) orally every 12 hours  atorvastatin 40 mg oral tablet: 1 tab(s) orally once a day  metFORMIN 850 mg oral tablet: 1 tab(s) orally 2 times a day   apixaban 5 mg oral tablet: 1 tab(s) orally every 12 hours  atorvastatin 40 mg oral tablet: 1 tab(s) orally once a day  metFORMIN 850 mg oral tablet: 1 tab(s) orally 2 times a day  metoprolol succinate 50 mg oral tablet, extended release: 1 tab(s) orally once a day

## 2021-03-02 ENCOUNTER — TRANSCRIPTION ENCOUNTER (OUTPATIENT)
Age: 71
End: 2021-03-02

## 2021-03-02 VITALS — TEMPERATURE: 98 F

## 2021-03-02 PROBLEM — E04.1 NONTOXIC SINGLE THYROID NODULE: Chronic | Status: ACTIVE | Noted: 2021-02-28

## 2021-03-02 PROBLEM — E11.9 TYPE 2 DIABETES MELLITUS WITHOUT COMPLICATIONS: Chronic | Status: ACTIVE | Noted: 2021-02-28

## 2021-03-02 PROBLEM — E78.5 HYPERLIPIDEMIA, UNSPECIFIED: Chronic | Status: ACTIVE | Noted: 2021-02-28

## 2021-03-02 LAB
GLUCOSE BLDC GLUCOMTR-MCNC: 134 MG/DL — HIGH (ref 70–99)
URATE SERPL-MCNC: 5.5 MG/DL — SIGNIFICANT CHANGE UP (ref 2.5–7)
URATE SERPL-MCNC: 5.6 MG/DL — SIGNIFICANT CHANGE UP (ref 2.5–7)

## 2021-03-02 PROCEDURE — 99239 HOSP IP/OBS DSCHRG MGMT >30: CPT

## 2021-03-02 PROCEDURE — 73130 X-RAY EXAM OF HAND: CPT | Mod: 26,LT

## 2021-03-02 RX ORDER — METOPROLOL TARTRATE 50 MG
1 TABLET ORAL
Qty: 30 | Refills: 0
Start: 2021-03-02 | End: 2021-03-31

## 2021-03-02 RX ORDER — ACETAMINOPHEN 500 MG
650 TABLET ORAL ONCE
Refills: 0 | Status: COMPLETED | OUTPATIENT
Start: 2021-03-02 | End: 2021-03-02

## 2021-03-02 RX ADMIN — Medication 650 MILLIGRAM(S): at 05:22

## 2021-03-02 RX ADMIN — APIXABAN 5 MILLIGRAM(S): 2.5 TABLET, FILM COATED ORAL at 05:22

## 2021-03-02 RX ADMIN — Medication 50 MILLIGRAM(S): at 05:22

## 2021-03-02 NOTE — CHART NOTE - NSCHARTNOTEFT_GEN_A_CORE
Called by nurse for pt c/o pain to the left hand  Pt in bed, no distress, c/o swelling and pain to left 4th finger, inability to make a full fist or bend the 4th finger since last night. No  other c/o  On exam, finger is swollen at the PIP joint, warm and tender to touch; no ulceration seen.   Hand XRay ordered, Uric acid level added to AM lab  Tylenol for pain
YASMANY MINER  [id number]    PROCEDURE:  DCCV      INDICATION:  persistent atrial fibrillation         ELECTROPHYSIOLOGIST(S):  Dr. Richard  Fellow Anam Hilliard      ANESTHESIOLOGY:  MAC      FINDINGS:  - SARA performed by cardiac imaging team and excluded LA/WAI clot  - successful conversion of atrial fibrillation into sinus rhythm with 120 joules synchronized cardioversion       COMPLICATIONS:  none      RECOMMENDATIONS:  - resume anti-coagulation  - f/u as OPT

## 2021-03-02 NOTE — DISCHARGE NOTE NURSING/CASE MANAGEMENT/SOCIAL WORK - NSDCFUADDAPPT_GEN_ALL_CORE_FT
Please follow up with your Cardiac Electrophysiology Provider, Dr. Anant Isidro at [address], [place] [address] on 04/29/2021 at 3:40pm.  Please note that the office of Dr. Isidro will contact you for an earlier appointment once available.    Appointment was scheduled by Ms. CJ Rossi, Referral Coordinator.

## 2021-03-02 NOTE — DISCHARGE NOTE NURSING/CASE MANAGEMENT/SOCIAL WORK - PATIENT PORTAL LINK FT
You can access the FollowMyHealth Patient Portal offered by [facility] by registering at the following website: [email or web address]. By joining [facility]’s FollowMyHealth portal, you will also be able to view your health information using other applications (apps) compatible with our system.

## 2021-03-08 DIAGNOSIS — I48.91 UNSPECIFIED ATRIAL FIBRILLATION: ICD-10-CM

## 2021-03-08 DIAGNOSIS — M79.642 PAIN IN LEFT HAND: ICD-10-CM

## 2021-03-08 DIAGNOSIS — Z79.84 LONG TERM (CURRENT) USE OF ORAL HYPOGLYCEMIC DRUGS: ICD-10-CM

## 2021-03-08 DIAGNOSIS — E11.9 TYPE 2 DIABETES MELLITUS WITHOUT COMPLICATIONS: ICD-10-CM

## 2021-03-08 DIAGNOSIS — I48.19 OTHER PERSISTENT ATRIAL FIBRILLATION: ICD-10-CM

## 2021-03-08 DIAGNOSIS — E78.5 HYPERLIPIDEMIA, UNSPECIFIED: ICD-10-CM

## 2021-03-16 PROCEDURE — 99291 CRITICAL CARE FIRST HOUR: CPT | Mod: 25

## 2021-03-16 PROCEDURE — 85027 COMPLETE CBC AUTOMATED: CPT

## 2021-03-16 PROCEDURE — 80048 BASIC METABOLIC PNL TOTAL CA: CPT

## 2021-03-16 PROCEDURE — 93005 ELECTROCARDIOGRAM TRACING: CPT

## 2021-03-16 PROCEDURE — C8925: CPT

## 2021-03-16 PROCEDURE — 82962 GLUCOSE BLOOD TEST: CPT

## 2021-03-16 PROCEDURE — 71045 X-RAY EXAM CHEST 1 VIEW: CPT

## 2021-03-16 PROCEDURE — 85730 THROMBOPLASTIN TIME PARTIAL: CPT

## 2021-03-16 PROCEDURE — 96374 THER/PROPH/DIAG INJ IV PUSH: CPT

## 2021-03-16 PROCEDURE — 36415 COLL VENOUS BLD VENIPUNCTURE: CPT

## 2021-03-16 PROCEDURE — 73130 X-RAY EXAM OF HAND: CPT

## 2021-03-16 PROCEDURE — 85610 PROTHROMBIN TIME: CPT

## 2021-03-16 PROCEDURE — 84550 ASSAY OF BLOOD/URIC ACID: CPT

## 2021-03-16 PROCEDURE — 70450 CT HEAD/BRAIN W/O DYE: CPT

## 2021-03-16 PROCEDURE — 83735 ASSAY OF MAGNESIUM: CPT

## 2021-03-19 ENCOUNTER — NON-APPOINTMENT (OUTPATIENT)
Age: 71
End: 2021-03-19

## 2021-03-19 ENCOUNTER — APPOINTMENT (OUTPATIENT)
Dept: HEART AND VASCULAR | Facility: CLINIC | Age: 71
End: 2021-03-19
Payer: MEDICARE

## 2021-03-19 VITALS
RESPIRATION RATE: 14 BRPM | OXYGEN SATURATION: 97 % | BODY MASS INDEX: 28.25 KG/M2 | HEART RATE: 67 BPM | HEIGHT: 67 IN | WEIGHT: 180 LBS | TEMPERATURE: 98 F | DIASTOLIC BLOOD PRESSURE: 76 MMHG | SYSTOLIC BLOOD PRESSURE: 110 MMHG

## 2021-03-19 PROCEDURE — 93000 ELECTROCARDIOGRAM COMPLETE: CPT

## 2021-03-19 PROCEDURE — 99072 ADDL SUPL MATRL&STAF TM PHE: CPT

## 2021-03-19 PROCEDURE — 99214 OFFICE O/P EST MOD 30 MIN: CPT

## 2021-03-19 RX ORDER — METFORMIN HYDROCHLORIDE 850 MG/1
850 TABLET, COATED ORAL
Refills: 0 | Status: ACTIVE | COMMUNITY

## 2021-03-19 NOTE — HISTORY OF PRESENT ILLNESS
[FreeTextEntry1] : 70F w/  DM, HTN, HLD recently admitted to [facility] 2/28/21 w/ new onset Atrial fibrillation w/ RVR. Was experiencing palpitations w/ dizziness x 2 days prior to proceeding to hospital. Pt is s/p SARA/DCCV 3/1/21 w/ subsequent return to NSR since. States since DC'd, no recurrence of symptoms. Started on BB and Eliquis during hositalization, no c/o abnl bleeding. States ET is unlimited, no excess caffeine, never smoker. No h/o SUMMERS or chest pain. \par \par ECG: NSR at 61 bpm, nl axis

## 2021-03-19 NOTE — PHYSICAL EXAM
[General Appearance - Well Developed] : well developed [Normal Appearance] : normal appearance [Well Groomed] : well groomed [General Appearance - Well Nourished] : well nourished [No Deformities] : no deformities [General Appearance - In No Acute Distress] : no acute distress [Normal Conjunctiva] : the conjunctiva exhibited no abnormalities [Eyelids - No Xanthelasma] : the eyelids demonstrated no xanthelasmas [Normal Oral Mucosa] : normal oral mucosa [No Oral Pallor] : no oral pallor [No Oral Cyanosis] : no oral cyanosis [Normal Jugular Venous A Waves Present] : normal jugular venous A waves present [Normal Jugular Venous V Waves Present] : normal jugular venous V waves present [No Jugular Venous Lamas A Waves] : no jugular venous lamas A waves [Respiration, Rhythm And Depth] : normal respiratory rhythm and effort [Exaggerated Use Of Accessory Muscles For Inspiration] : no accessory muscle use [Auscultation Breath Sounds / Voice Sounds] : lungs were clear to auscultation bilaterally [Heart Rate And Rhythm] : heart rate and rhythm were normal [Heart Sounds] : normal S1 and S2 [Murmurs] : no murmurs present [Abdomen Soft] : soft [Abdomen Tenderness] : non-tender [Abdomen Mass (___ Cm)] : no abdominal mass palpated [Abnormal Walk] : normal gait [Gait - Sufficient For Exercise Testing] : the gait was sufficient for exercise testing [Nail Clubbing] : no clubbing of the fingernails [Cyanosis, Localized] : no localized cyanosis [Petechial Hemorrhages (___cm)] : no petechial hemorrhages [Skin Color & Pigmentation] : normal skin color and pigmentation [] : no rash [No Venous Stasis] : no venous stasis [Skin Lesions] : no skin lesions [No Skin Ulcers] : no skin ulcer [No Xanthoma] : no  xanthoma was observed [Oriented To Time, Place, And Person] : oriented to person, place, and time [Affect] : the affect was normal [Mood] : the mood was normal [No Anxiety] : not feeling anxious

## 2021-03-19 NOTE — ASSESSMENT
[FreeTextEntry1] : 1. Atrial fibrillation \par - now in NSR\par - cont Eliquis and Toprol\par - hospitalization reports reviewed\par - 3/1/21 SARA c/w nl LV fxn, EF 65%, mild AI/MR\par - results discussed in detail with pt \par - repeat TTE in 6 months\par \par 2. HTN\par - stable \par - continue current meds for now \par \par 3. HLD\par - stable\par - cont statin\par \par 4. DM\par - stable \par - continue current meds for now\par \par 5. Overweight\par - BMI 28\par - ed done re heart healthy lifestyle modifications and diet \par \par RTC 6 months \par

## 2021-04-08 ENCOUNTER — APPOINTMENT (OUTPATIENT)
Dept: HEART AND VASCULAR | Facility: CLINIC | Age: 71
End: 2021-04-08
Payer: MEDICARE

## 2021-04-08 ENCOUNTER — NON-APPOINTMENT (OUTPATIENT)
Age: 71
End: 2021-04-08

## 2021-04-08 VITALS
HEIGHT: 67 IN | TEMPERATURE: 97.8 F | WEIGHT: 180 LBS | SYSTOLIC BLOOD PRESSURE: 134 MMHG | HEART RATE: 61 BPM | BODY MASS INDEX: 28.25 KG/M2 | DIASTOLIC BLOOD PRESSURE: 74 MMHG

## 2021-04-08 PROCEDURE — 93000 ELECTROCARDIOGRAM COMPLETE: CPT

## 2021-04-08 PROCEDURE — 99072 ADDL SUPL MATRL&STAF TM PHE: CPT

## 2021-04-08 PROCEDURE — 99214 OFFICE O/P EST MOD 30 MIN: CPT | Mod: 25

## 2021-04-13 NOTE — DISCUSSION/SUMMARY
[FreeTextEntry1] : 70 year old female with HTN, HLD, DMII and newly diagnosed atrial fibrillation s/p SARA/cardioversion, who presents for follow up.  We discussed what atrial fibrillation is, the natural progression of this arrhythmia and the association with stroke.  SHe is maintained on Eliquis.  We discussed that it is unclear when atrial fibrillation comes back and at that time options include repeat cardioversion, rate control or an ablation.  She is a good candidate for an ablation - but given that this is her first episode I would like to see how she does.  She would like to also wait and see what her overall afib burden is.  She will follow up in 6 months or sooner if needed.  SHe knows to call with any questions or concerns.

## 2021-04-13 NOTE — ADDENDUM
[FreeTextEntry1] : I affirm that I was present for the entire office visit, including the history taking, physical exam and discussion of the diagnosis and treatment options with the patient. I agree with the note as written by my PA, Ms. Cook. I affirm that this is true under penalty by law.

## 2021-04-13 NOTE — PHYSICAL EXAM
[General Appearance - Well Developed] : well developed [Normal Appearance] : normal appearance [Well Groomed] : well groomed [General Appearance - Well Nourished] : well nourished [No Deformities] : no deformities [General Appearance - In No Acute Distress] : no acute distress [Normal Conjunctiva] : the conjunctiva exhibited no abnormalities [Normal Oral Mucosa] : normal oral mucosa [Normal Jugular Venous V Waves Present] : normal jugular venous V waves present [5th Left ICS - MCL] : palpated at the 5th LICS in the midclavicular line [Normal] : normal [No Precordial Heave] : no precordial heave was noted [Normal Rate] : normal [Rhythm Regular] : regular [Normal S1] : normal S1 [Normal S2] : normal S2 [No Pitting Edema] : no pitting edema present [] : no respiratory distress [Respiration, Rhythm And Depth] : normal respiratory rhythm and effort [Exaggerated Use Of Accessory Muscles For Inspiration] : no accessory muscle use [Abdomen Soft] : soft [Abnormal Walk] : normal gait [Cyanosis, Localized] : no localized cyanosis [Skin Color & Pigmentation] : normal skin color and pigmentation [Impaired Insight] : insight and judgment were intact [Oriented To Time, Place, And Person] : oriented to person, place, and time [Affect] : the affect was normal [Mood] : the mood was normal [No Anxiety] : not feeling anxious [Apical Thrill] : no thrill palpable at the apex [Click] : no click [Distant] : the heart sounds were ~L not distant [Pericardial Rub] : no pericardial rub

## 2021-04-13 NOTE — HISTORY OF PRESENT ILLNESS
[FreeTextEntry1] : 70 year old female with HTN, HLD, DMII and newly diagnosed atrial fibrillation s/p SARA/cardioversion, who presents for follow up.\par \par She experienced palpitations and dizziness and came to the ER and was in newly diagnosed atrial fibrillation. \par She was started on metoprolol 25mg BID and eliquis 5mg q 12.and was ultimately cardioverted.  Since discharge from the hospital she has been doing well.  No further palpitations.  No SOB, lightheadedness, syncope, near syncope or chest pain.  She does not body aches mostly in her back.  EF while in the hospital was 65%.  She is compliant with Eliqius.  \par

## 2021-05-06 ENCOUNTER — APPOINTMENT (OUTPATIENT)
Dept: HEART AND VASCULAR | Facility: CLINIC | Age: 71
End: 2021-05-06

## 2021-07-04 NOTE — ED PROVIDER NOTE - CADM POA PRESS ULCER
CONSTITUTIONAL: No fevers, chills, fatigue, unexpected weight change, decreased appetite  ENT: No ear pain, nasal congestion, runny nose, sore throat  CV: No chest pain  PULM: No cough, shortness of breath  GI: + abdominal pain. No nausea, vomiting, diarrhea, constipation, bloody stools  : No dysuria, polyuria, hematuria  SKIN: No rashes, swelling  MSK: No back pain, flank pain  NEURO: No headache  PSYCH: No SI/HI, hallucinations
No

## 2021-08-24 NOTE — ED ADULT TRIAGE NOTE - HEIGHT IN CM
"Pt supposed to come into office to be seen. Wasn't seen via telehealth because if swab was negative pt was to come to be seen in person.     Chief Complaint  Cough, Nasal Congestion, Headache, and Sore Throat    Subjective    History of Present Illness      Patient presents to [facility] for   History of Present Illness     Review of Systems    I have reviewed and agree with the HPI and ROS information as above.  Mer Posada, APRN     Objective   Vital Signs:   Ht 149.9 cm (59\")   Wt 47.6 kg (105 lb)   BMI 21.21 kg/m²       Physical Exam     Result Review  Data Reviewed:                   Assessment and Plan      Problem List Items Addressed This Visit     None      Visit Diagnoses     Cough    -  Primary              Follow Up   No follow-ups on file.  Patient was given instructions and counseling regarding his condition or for health maintenance advice. Please see specific information pulled into the AVS if appropriate.       " 170.18

## 2021-09-17 ENCOUNTER — NON-APPOINTMENT (OUTPATIENT)
Age: 71
End: 2021-09-17

## 2021-09-17 ENCOUNTER — APPOINTMENT (OUTPATIENT)
Dept: HEART AND VASCULAR | Facility: CLINIC | Age: 71
End: 2021-09-17
Payer: MEDICARE

## 2021-09-17 VITALS
DIASTOLIC BLOOD PRESSURE: 95 MMHG | TEMPERATURE: 96.9 F | BODY MASS INDEX: 27.94 KG/M2 | SYSTOLIC BLOOD PRESSURE: 129 MMHG | OXYGEN SATURATION: 99 % | HEIGHT: 67 IN | HEART RATE: 60 BPM | WEIGHT: 178 LBS

## 2021-09-17 PROCEDURE — 93306 TTE W/DOPPLER COMPLETE: CPT

## 2021-09-17 PROCEDURE — 93000 ELECTROCARDIOGRAM COMPLETE: CPT

## 2021-09-17 PROCEDURE — 99214 OFFICE O/P EST MOD 30 MIN: CPT

## 2021-09-17 NOTE — HISTORY OF PRESENT ILLNESS
[FreeTextEntry1] : 71F w/  DM, HTN, HLD recently admitted to [facility] 2/28/21 w/ new onset Atrial fibrillation w/ RVR. Was experiencing palpitations w/ dizziness x 2 days prior to proceeding to hospital. Pt is s/p SARA/DCCV 3/1/21 w/ subsequent return to NSR since. States since DC'd, no recurrence of symptoms. Started on BB and Eliquis during hospitalization, no c/o abnl bleeding. States ET is unlimited, no excess caffeine, never smoker. No h/o SUMMERS or chest pain. Returns today for 6 month f/u. No new complaints Walks >10K a day. \par \par \par 9/17/21 ECG: sinus bradycardia at 57bpm, nl axis \par ECG: NSR at 61 bpm, nl axis

## 2021-09-17 NOTE — ASSESSMENT
[FreeTextEntry1] : 1. Atrial fibrillation \par - now in NSR\par - cont Eliquis and Toprol\par - hospitalization reports reviewed\par - 3/1/21 SARA c/w nl LV fxn, EF 65%, mild AI/MR\par - seen by EP for f/u 4/2021, stable\par - repeat TTE \par \par 2. HTN\par - uncontrolled \par - start lisin/HCT 10/12.5 qd, ed done re SE/AE\par - continue current meds for now \par \par 3. HLD\par - stable\par - cont statin\par \par 4. DM\par - stable \par - continue current meds for now\par \par 5. Overweight\par - BMI 28\par - ed done re heart healthy lifestyle modifications and diet \par \par RTC 6 months \par

## 2021-10-07 ENCOUNTER — NON-APPOINTMENT (OUTPATIENT)
Age: 71
End: 2021-10-07

## 2021-10-07 ENCOUNTER — APPOINTMENT (OUTPATIENT)
Dept: HEART AND VASCULAR | Facility: CLINIC | Age: 71
End: 2021-10-07
Payer: MEDICARE

## 2021-10-07 VITALS
WEIGHT: 178 LBS | BODY MASS INDEX: 27.94 KG/M2 | SYSTOLIC BLOOD PRESSURE: 111 MMHG | DIASTOLIC BLOOD PRESSURE: 73 MMHG | HEIGHT: 67 IN | HEART RATE: 62 BPM

## 2021-10-07 PROCEDURE — 99213 OFFICE O/P EST LOW 20 MIN: CPT

## 2021-10-07 PROCEDURE — 93000 ELECTROCARDIOGRAM COMPLETE: CPT

## 2021-10-13 NOTE — HISTORY OF PRESENT ILLNESS
[FreeTextEntry1] : 71 year old female with HTN, HLD, DMII and newly diagnosed atrial fibrillation s/p SARA/cardioversion, who presents for follow up.\par \par 3/2021 she experienced palpitations and dizziness and came to the ER and was in newly diagnosed atrial fibrillation. \par She was started on metoprolol 25mg BID and eliquis 5mg q 12.and was ultimately cardioverted.  \par \par SHe presents for follow up and overall is doing well.  No further palpitations.   No SOB, lightheadedness, syncope, near syncope or chest pain. EF while in the hospital was 65%.  She is compliant with Eliqius.  \par  \par

## 2021-10-13 NOTE — PHYSICAL EXAM
[General Appearance - Well Developed] : well developed [Normal Appearance] : normal appearance [Well Groomed] : well groomed [General Appearance - Well Nourished] : well nourished [No Deformities] : no deformities [General Appearance - In No Acute Distress] : no acute distress [Normal Conjunctiva] : the conjunctiva exhibited no abnormalities [Normal Oral Mucosa] : normal oral mucosa [Normal Jugular Venous V Waves Present] : normal jugular venous V waves present [5th Left ICS - MCL] : palpated at the 5th LICS in the midclavicular line [Normal] : normal [No Precordial Heave] : no precordial heave was noted [Normal Rate] : normal [Rhythm Regular] : regular [Normal S1] : normal S1 [Normal S2] : normal S2 [No Pitting Edema] : no pitting edema present [] : no respiratory distress [Respiration, Rhythm And Depth] : normal respiratory rhythm and effort [Exaggerated Use Of Accessory Muscles For Inspiration] : no accessory muscle use [Abnormal Walk] : normal gait [Cyanosis, Localized] : no localized cyanosis [Skin Color & Pigmentation] : normal skin color and pigmentation [Oriented To Time, Place, And Person] : oriented to person, place, and time [Impaired Insight] : insight and judgment were intact [Affect] : the affect was normal [Mood] : the mood was normal [No Anxiety] : not feeling anxious [Apical Thrill] : no thrill palpable at the apex [Click] : no click [Pericardial Rub] : no pericardial rub

## 2021-10-13 NOTE — DISCUSSION/SUMMARY
[FreeTextEntry1] : 71 year old female with HTN, HLD, DMII and newly diagnosed atrial fibrillation s/p SARA/cardioversion, who presents for follow up.  We discussed what atrial fibrillation is, the natural progression of this arrhythmia and the association with stroke.  SHe is maintained on Eliquis.  We discussed that it is unclear when atrial fibrillation comes back and at that time options include repeat cardioversion, rate control or an ablation.  She has no symptoms concerning for recurrent arrhythmia.  She is a good candidate for an ablation - but given that this is her first episode I would like to see how she does.  WE will continue with observation for now.  She will follow up in 6 months or sooner if needed.  SHe knows to call with any questions or concerns.

## 2022-03-18 ENCOUNTER — NON-APPOINTMENT (OUTPATIENT)
Age: 72
End: 2022-03-18

## 2022-03-18 ENCOUNTER — APPOINTMENT (OUTPATIENT)
Dept: HEART AND VASCULAR | Facility: CLINIC | Age: 72
End: 2022-03-18
Payer: MEDICARE

## 2022-03-18 VITALS
DIASTOLIC BLOOD PRESSURE: 71 MMHG | OXYGEN SATURATION: 96 % | SYSTOLIC BLOOD PRESSURE: 134 MMHG | HEART RATE: 71 BPM | HEIGHT: 67 IN | TEMPERATURE: 97.5 F | BODY MASS INDEX: 28.25 KG/M2 | WEIGHT: 180 LBS

## 2022-03-18 PROCEDURE — 93000 ELECTROCARDIOGRAM COMPLETE: CPT

## 2022-03-18 PROCEDURE — 99214 OFFICE O/P EST MOD 30 MIN: CPT

## 2022-04-02 ENCOUNTER — NON-APPOINTMENT (OUTPATIENT)
Age: 72
End: 2022-04-02

## 2022-04-04 ENCOUNTER — APPOINTMENT (OUTPATIENT)
Dept: CT IMAGING | Facility: HOSPITAL | Age: 72
End: 2022-04-04

## 2022-04-06 NOTE — HISTORY OF PRESENT ILLNESS
[FreeTextEntry1] : 71F w/  DM, HTN, HLD recently admitted to [facility] 2/28/21 w/ new onset Atrial fibrillation w/ RVR. Was experiencing palpitations w/ dizziness x 2 days prior to proceeding to hospital. Pt is s/p SARA/DCCV 3/1/21 w/ subsequent return to NSR since. States since DC'd, no recurrence of symptoms. Started on BB and Eliquis during hospitalization, no c/o abnl bleeding. States ET is unlimited, no excess caffeine, never smoker. No h/o SUMMERS or chest pain. Returns today for 6 month f/u. Previously stated she had unlimited ET however today states she had a fall on the street after feeling dizzy. No syncope. A similar episode occurred 2/8/22 and she had a fall in the shower. \par \par 3/18/22 ECG: NSR at 69 bpm, nl axis, poor RWP \par 9/17/21 ECG: sinus bradycardia at 57bpm, nl axis \par ECG: NSR at 61 bpm, nl axis

## 2022-04-06 NOTE — ASSESSMENT
[FreeTextEntry1] : 1. Atrial fibrillation \par - now in NSR\par - cont Eliquis and Toprol\par - hospitalization reports reviewed\par - 3/1/21 SARA c/w nl LV fxn, EF 65%, mild AI/MR\par - seen by EP for f/u 4/2021, stable\par - repeat TTE 9/17/21 c/w nl LV/RV fxn, mild MR/AI, mild to mod TR, PASP 32 mmHg\par \par 2. Dizziness\par - check Event Monitor \par - CT Head \par - repeat TTE\par - further recommendations pending results \par \par \par 3. HTN\par - improved\par - cont lisin/HCT 10/12.5 qd, ed done re SE/AE\par - continue current meds for now \par \par 4. HLD\par - stable\par - cont statin\par \par 5. DM\par - stable \par - continue current meds for now\par \par 6. Overweight\par - BMI 28\par - ed done re heart healthy lifestyle modifications and diet \par \par RTC 6 months \par

## 2022-04-07 ENCOUNTER — APPOINTMENT (OUTPATIENT)
Dept: HEART AND VASCULAR | Facility: CLINIC | Age: 72
End: 2022-04-07
Payer: MEDICARE

## 2022-04-07 ENCOUNTER — NON-APPOINTMENT (OUTPATIENT)
Age: 72
End: 2022-04-07

## 2022-04-07 VITALS
WEIGHT: 180 LBS | TEMPERATURE: 97.3 F | BODY MASS INDEX: 28.25 KG/M2 | DIASTOLIC BLOOD PRESSURE: 84 MMHG | HEART RATE: 66 BPM | SYSTOLIC BLOOD PRESSURE: 126 MMHG | HEIGHT: 67 IN

## 2022-04-07 PROCEDURE — 93000 ELECTROCARDIOGRAM COMPLETE: CPT

## 2022-04-07 PROCEDURE — 99213 OFFICE O/P EST LOW 20 MIN: CPT

## 2022-04-08 ENCOUNTER — APPOINTMENT (OUTPATIENT)
Dept: HEART AND VASCULAR | Facility: CLINIC | Age: 72
End: 2022-04-08
Payer: MEDICARE

## 2022-04-08 VITALS
DIASTOLIC BLOOD PRESSURE: 80 MMHG | SYSTOLIC BLOOD PRESSURE: 121 MMHG | BODY MASS INDEX: 28.41 KG/M2 | TEMPERATURE: 98 F | OXYGEN SATURATION: 96 % | WEIGHT: 181 LBS | HEIGHT: 67 IN | HEART RATE: 86 BPM

## 2022-04-08 DIAGNOSIS — R29.6 REPEATED FALLS: ICD-10-CM

## 2022-04-08 DIAGNOSIS — R42 DIZZINESS AND GIDDINESS: ICD-10-CM

## 2022-04-08 PROCEDURE — 99213 OFFICE O/P EST LOW 20 MIN: CPT

## 2022-04-23 NOTE — DISCUSSION/SUMMARY
[FreeTextEntry1] : 71 year old female with HTN, HLD, DMII and newly diagnosed atrial fibrillation 3/2021 s/p SARA/cardioversion, who presents for follow up.  We discussed what atrial fibrillation is, the natural progression of this arrhythmia and the association with stroke.  She is maintained on Eliquis.  We discussed that it is unclear when atrial fibrillation comes back and at that time options include repeat cardioversion, rate control or an ablation.  She has no symptoms concerning for recurrent arrhythmia and would like to continue with observation.  She is a good candidate for an ablation - but given that this is her first episode I would like to see how she does.  She had some lightheadedness and her event monitor was reviewed with no significant arrhythmias- no afib.  Lightheadedness has resolved.    She knows to call with any questions or concerns.

## 2022-04-23 NOTE — HISTORY OF PRESENT ILLNESS
[FreeTextEntry1] : 71 year old female with HTN, HLD, DMII and atrial fibrillation s/p SARA/cardioversion, who presents for follow up.\par \par 3/2021 she experienced palpitations and dizziness and came to the ER and was in newly diagnosed atrial fibrillation. \par She was started on metoprolol 25mg BID and eliquis 5mg BID and was ultimately cardioverted.  \par \par She presents for follow up and overall is doing well.  No further palpitations.   She had one episode of near syncope.  She states she was in the shower closed her eyes and became dizzy.  She then wore an event monitor for 7 days with rates 48- and 9 beats of SVT.  Since then she has been feeling well.  No SOB, lightheadedness, syncope, near syncope or chest pain. EF while in the hospital was 65%.  She is compliant with Eliqius.  \par  \par

## 2022-04-23 NOTE — REVIEW OF SYSTEMS
[Negative] : Heme/Lymph [Fever] : no fever [Weight Gain (___ Lbs)] : no recent weight gain [Chills] : no chills [Feeling Fatigued] : not feeling fatigued [Weight Loss (___ Lbs)] : no recent weight loss [SOB] : no shortness of breath [Dyspnea on exertion] : not dyspnea during exertion [Palpitations] : no palpitations [Syncope] : no syncope [Cough] : no cough

## 2022-04-23 NOTE — PHYSICAL EXAM
[General Appearance - Well Developed] : well developed [Normal Appearance] : normal appearance [Well Groomed] : well groomed [General Appearance - Well Nourished] : well nourished [No Deformities] : no deformities [General Appearance - In No Acute Distress] : no acute distress [Normal Conjunctiva] : the conjunctiva exhibited no abnormalities [Normal Oral Mucosa] : normal oral mucosa [Normal Jugular Venous V Waves Present] : normal jugular venous V waves present [5th Left ICS - MCL] : palpated at the 5th LICS in the midclavicular line [Normal] : normal [No Precordial Heave] : no precordial heave was noted [Normal Rate] : normal [Rhythm Regular] : regular [Normal S1] : normal S1 [Normal S2] : normal S2 [No Pitting Edema] : no pitting edema present [] : no respiratory distress [Respiration, Rhythm And Depth] : normal respiratory rhythm and effort [Exaggerated Use Of Accessory Muscles For Inspiration] : no accessory muscle use [Abnormal Walk] : normal gait [Skin Color & Pigmentation] : normal skin color and pigmentation [Oriented To Time, Place, And Person] : oriented to person, place, and time [Impaired Insight] : insight and judgment were intact [Affect] : the affect was normal [Mood] : the mood was normal [No Anxiety] : not feeling anxious [Auscultation Breath Sounds / Voice Sounds] : lungs were clear to auscultation bilaterally [Apical Thrill] : no thrill palpable at the apex [Click] : no click

## 2022-04-26 ENCOUNTER — APPOINTMENT (OUTPATIENT)
Dept: HEART AND VASCULAR | Facility: CLINIC | Age: 72
End: 2022-04-26

## 2022-05-14 ENCOUNTER — APPOINTMENT (OUTPATIENT)
Dept: CT IMAGING | Facility: HOSPITAL | Age: 72
End: 2022-05-14

## 2022-05-14 ENCOUNTER — OUTPATIENT (OUTPATIENT)
Dept: OUTPATIENT SERVICES | Facility: HOSPITAL | Age: 72
LOS: 1 days | End: 2022-05-14
Payer: MEDICARE

## 2022-05-14 ENCOUNTER — RESULT REVIEW (OUTPATIENT)
Age: 72
End: 2022-05-14

## 2022-05-14 DIAGNOSIS — Z96.651 PRESENCE OF RIGHT ARTIFICIAL KNEE JOINT: Chronic | ICD-10-CM

## 2022-05-14 DIAGNOSIS — Z90.710 ACQUIRED ABSENCE OF BOTH CERVIX AND UTERUS: Chronic | ICD-10-CM

## 2022-05-14 LAB — POCT ISTAT CREATININE: 0.8 MG/DL — SIGNIFICANT CHANGE UP (ref 0.5–1.3)

## 2022-05-14 PROCEDURE — 70460 CT HEAD/BRAIN W/DYE: CPT | Mod: 26

## 2022-05-14 PROCEDURE — 70460 CT HEAD/BRAIN W/DYE: CPT

## 2022-05-14 PROCEDURE — 82565 ASSAY OF CREATININE: CPT

## 2022-05-31 PROBLEM — R42 DIZZINESS: Status: ACTIVE | Noted: 2022-03-18

## 2022-05-31 PROBLEM — R29.6 FREQUENT FALLS: Status: ACTIVE | Noted: 2022-03-18

## 2022-05-31 NOTE — ASSESSMENT
[FreeTextEntry1] : 1. Atrial fibrillation \par - now in NSR\par - cont Eliquis and Toprol\par - hospitalization reports reviewed\par - 3/1/21 SARA c/w nl LV fxn, EF 65%, mild AI/MR\par - seen by EP for f/u 4/2021, stable\par - repeat TTE 9/17/21 c/w nl LV/RV fxn, mild MR/AI, mild to mod TR, PASP 32 mmHg\par \par 2. Dizziness\par -  Event Monitor short runs of SVT\par - results discussed in detail with pt \par - reschedule CT Head \par - reschedule\par - further recommendations pending results \par \par \par 3. HTN\par - improved\par - cont lisin/HCT 10/12.5 qd, ed done re SE/AE\par - continue current meds for now \par \par 4. HLD\par - stable\par - cont statin\par \par 5. DM\par - stable \par - continue current meds for now\par \par 6. Overweight\par - BMI 28\par - ed done re heart healthy lifestyle modifications and diet \par \par RTC 6 months \par \par

## 2022-05-31 NOTE — HISTORY OF PRESENT ILLNESS
[FreeTextEntry1] : 71F w/  DM, HTN, HLD recently admitted to [facility] 2/28/21 w/ new onset Atrial fibrillation w/ RVR. Was experiencing palpitations w/ dizziness x 2 days prior to proceeding to hospital. Pt is s/p SARA/DCCV 3/1/21 w/ subsequent return to NSR since. States since DC'd, no recurrence of symptoms. Started on BB and Eliquis during hospitalization, no c/o abnl bleeding. States ET is unlimited, no excess caffeine, never smoker. No h/o SUMMERS or chest pain. Returns today for 6 month f/u. Previously stated she had unlimited ET however today states she had a fall on the street after feeling dizzy. No syncope. A similar episode occurred 2/8/22 and she had a fall in the shower. \par Returns today for results of TTE and Event monitor. Unable to go for TTE appt. \par \par 3/18/22 ECG: NSR at 69 bpm, nl axis, poor RWP \par 9/17/21 ECG: sinus bradycardia at 57bpm, nl axis \par ECG: NSR at 61 bpm, nl axis

## 2022-10-06 ENCOUNTER — NON-APPOINTMENT (OUTPATIENT)
Age: 72
End: 2022-10-06

## 2022-10-06 ENCOUNTER — APPOINTMENT (OUTPATIENT)
Dept: HEART AND VASCULAR | Facility: CLINIC | Age: 72
End: 2022-10-06

## 2022-10-06 VITALS
TEMPERATURE: 97.3 F | BODY MASS INDEX: 29.03 KG/M2 | HEART RATE: 63 BPM | HEIGHT: 67 IN | DIASTOLIC BLOOD PRESSURE: 82 MMHG | SYSTOLIC BLOOD PRESSURE: 129 MMHG | WEIGHT: 185 LBS

## 2022-10-06 PROCEDURE — 93000 ELECTROCARDIOGRAM COMPLETE: CPT

## 2022-10-06 PROCEDURE — 99213 OFFICE O/P EST LOW 20 MIN: CPT

## 2022-10-12 NOTE — DISCUSSION/SUMMARY
[EKG obtained to assist in diagnosis and management of assessed problem(s)] : EKG obtained to assist in diagnosis and management of assessed problem(s) [FreeTextEntry1] : 72 year old female with HTN, HLD, DMII and newly diagnosed atrial fibrillation 3/2021 s/p SARA/cardioversion, who presents for follow up.  We again discussed what atrial fibrillation is, the natural progression of this arrhythmia and the association with stroke.  She is maintained on Eliquis.  We discussed that it is unclear when atrial fibrillation comes back and at that time options include repeat cardioversion, rate control or an ablation.  It is likely the "anxiety" she feels in her chest is short bursts of afib / SVT but she states they are infrequent and do not bug her at this time..  She is a good candidate for an ablation but would like to continue with observation for now.  She will follow up in 6 months or sooner if needed.  She knows to call with any questions or concerns.

## 2022-10-12 NOTE — HISTORY OF PRESENT ILLNESS
[FreeTextEntry1] : 72 year old female with HTN, HLD, DMII and atrial fibrillation s/p SARA/cardioversion 2021, who presents for follow up.\par \par 3/2021 she experienced palpitations and dizziness and came to the ER and was in newly diagnosed atrial fibrillation. \par She was started on metoprolol 25mg BID and eliquis 5mg BID and was ultimately cardioverted.  She is compliant with these medications.\par She presents for follow up and has no complaints.  She states sometimes she "Feels anxiety in her chest" but states this last seconds.  She denies any other further palpitations.  She then wore an event monitor 3/2022 for 7 days with rates 48- and 9 beats of SVT.   No SOB, lightheadedness, syncope, near syncope or chest pain. EF while in the hospital was 65%.  \par

## 2022-10-12 NOTE — REVIEW OF SYSTEMS
[Negative] : Heme/Lymph [Fever] : no fever [Chills] : no chills [Feeling Fatigued] : not feeling fatigued [SOB] : no shortness of breath [Dyspnea on exertion] : not dyspnea during exertion [Palpitations] : no palpitations [Syncope] : no syncope [Cough] : no cough [Wheezing] : no wheezing

## 2022-10-12 NOTE — PHYSICAL EXAM
[General Appearance - Well Developed] : well developed [Normal Appearance] : normal appearance [Well Groomed] : well groomed [General Appearance - Well Nourished] : well nourished [No Deformities] : no deformities [General Appearance - In No Acute Distress] : no acute distress [Normal Conjunctiva] : the conjunctiva exhibited no abnormalities [Normal Oral Mucosa] : normal oral mucosa [Normal Jugular Venous V Waves Present] : normal jugular venous V waves present [5th Left ICS - MCL] : palpated at the 5th LICS in the midclavicular line [Normal] : normal [No Precordial Heave] : no precordial heave was noted [Normal Rate] : normal [Rhythm Regular] : regular [Normal S1] : normal S1 [Normal S2] : normal S2 [No Pitting Edema] : no pitting edema present [] : no respiratory distress [Respiration, Rhythm And Depth] : normal respiratory rhythm and effort [Exaggerated Use Of Accessory Muscles For Inspiration] : no accessory muscle use [Auscultation Breath Sounds / Voice Sounds] : lungs were clear to auscultation bilaterally [Abnormal Walk] : normal gait [Oriented To Time, Place, And Person] : oriented to person, place, and time [Impaired Insight] : insight and judgment were intact [Affect] : the affect was normal [Mood] : the mood was normal [No Anxiety] : not feeling anxious [Apical Thrill] : no thrill palpable at the apex [S3] : no S3 [S4] : no S4 [Click] : no click

## 2022-10-12 NOTE — CARDIOLOGY SUMMARY
[___] : [unfilled] [de-identified] : 10/7/21 sinus at 63bpm\par 4/7/22 SInus at 65\par 10/6/2022 NSR at 65bpm

## 2022-10-14 ENCOUNTER — APPOINTMENT (OUTPATIENT)
Dept: HEART AND VASCULAR | Facility: CLINIC | Age: 72
End: 2022-10-14

## 2022-10-14 VITALS
DIASTOLIC BLOOD PRESSURE: 76 MMHG | HEART RATE: 65 BPM | BODY MASS INDEX: 29.35 KG/M2 | WEIGHT: 187 LBS | SYSTOLIC BLOOD PRESSURE: 122 MMHG | HEIGHT: 67 IN | TEMPERATURE: 97.7 F | OXYGEN SATURATION: 97 %

## 2022-10-14 PROCEDURE — 99214 OFFICE O/P EST MOD 30 MIN: CPT

## 2022-10-21 ENCOUNTER — APPOINTMENT (OUTPATIENT)
Dept: HEART AND VASCULAR | Facility: CLINIC | Age: 72
End: 2022-10-21

## 2022-10-25 ENCOUNTER — APPOINTMENT (OUTPATIENT)
Dept: HEART AND VASCULAR | Facility: CLINIC | Age: 72
End: 2022-10-25

## 2022-10-25 PROCEDURE — 93306 TTE W/DOPPLER COMPLETE: CPT

## 2022-11-04 ENCOUNTER — APPOINTMENT (OUTPATIENT)
Dept: HEART AND VASCULAR | Facility: CLINIC | Age: 72
End: 2022-11-04

## 2022-11-04 ENCOUNTER — NON-APPOINTMENT (OUTPATIENT)
Age: 72
End: 2022-11-04

## 2022-11-04 VITALS
HEART RATE: 64 BPM | BODY MASS INDEX: 29.35 KG/M2 | TEMPERATURE: 98.2 F | OXYGEN SATURATION: 98 % | WEIGHT: 187 LBS | DIASTOLIC BLOOD PRESSURE: 98 MMHG | SYSTOLIC BLOOD PRESSURE: 132 MMHG | HEIGHT: 67 IN

## 2022-11-04 PROCEDURE — 93000 ELECTROCARDIOGRAM COMPLETE: CPT

## 2022-11-04 PROCEDURE — 99214 OFFICE O/P EST MOD 30 MIN: CPT

## 2022-11-09 NOTE — ASSESSMENT
[FreeTextEntry1] : 1. Atrial fibrillation \par - now in NSR\par - cont Eliquis and Toprol\par - hospitalization reports reviewed\par - 3/1/21 SARA c/w nl LV fxn, EF 65%, mild AI/MR\par - seen by EP for f/u 4/2021, stable\par - TTE 9/17/21 c/w nl LV/RV fxn, mild MR/AI, mild to mod TR, PASP 32 mmHg\par - repeat TTE\par \par 2. Dizziness\par - Event Monitor short runs of SVT\par - results discussed in detail with pt \par - CT head wnl\par \par \par 3. HTN\par - improved\par - cont lisin/HCT 10/12.5 qd, ed done re SE/AE\par - continue current meds for now \par \par 4. HLD\par - stable\par - cont statin\par \par 5. DM\par - stable \par - continue current meds for now\par \par 6. Overweight\par - BMI 29\par - ed done re heart healthy lifestyle modifications and diet \par \par RTC 6 months \par

## 2022-11-09 NOTE — ASSESSMENT
[FreeTextEntry1] : 1. Atrial fibrillation \par - now in NSR\par - cont Eliquis and Toprol\par - hospitalization reports reviewed\par - 3/1/21 SARA c/w nl LV fxn, EF 65%, mild AI/MR\par - seen by EP for f/u 4/2021, stable\par - TTE 9/17/21 c/w nl LV/RV fxn, mild MR/AI, mild to mod TR, PASP 32 mmHg\par - repeat TTE c/w nl EF, MVP\par - results discussed in detail with pt \par - start ASA 81 qd after colonoscopy\par \par 2. Preoperative cardiac evaluation prior to colonoscopy\par - ECG wnl, METS >4\par - hold Eliquisthe day before, day of and day after colonoscopy; restart thereafter\par - pt is medically optimized from a cardiac standpoint for the upcoming surgery with the aforementioned recommendations  \par \par \par 3. Dizziness\par - Event Monitor short runs of SVT\par - results discussed in detail with pt \par - CT head wnl\par \par \par 4. HTN\par - suboptimally controlled\par - increase lisin/HCT 10/12.5 qd to 20/12.5 qd; ed done re SE/AE\par - continue rest of current meds for now \par \par 5. HLD\par - stable\par - cont statin\par \par 6. DM\par - stable \par - continue current meds for now\par \par 7. Overweight\par - BMI 29\par - ed done re heart healthy lifestyle modifications and diet \par \par RTC 6 months \par

## 2022-11-09 NOTE — HISTORY OF PRESENT ILLNESS
[FreeTextEntry1] : 72F w/  DM, HTN, HLD recently admitted to [facility] 2/28/21 w/ new onset Atrial fibrillation w/ RVR. Was experiencing palpitations w/ dizziness x 2 days prior to proceeding to hospital. Pt is s/p SARA/DCCV 3/1/21 w/ subsequent return to NSR since. States since DC'd, no recurrence of symptoms. Started on BB and Eliquis during hospitalization, no c/o abnl bleeding. States ET is unlimited, no excess caffeine, never smoker. No h/o SUMMERS or chest pain.  Previously stated she had unlimited ET however  had a fall on the street after feeling dizzy. No syncope. A similar episode occurred 2/8/22 and she had a fall in the shower. Results of repeat Event monitor discussed previously, no recurrence of afib seen. Returned 3 weeks ago for f/u, no new complaints. States she has not had any recurrence of dizziness or syncope. Now returns for preop cardiac evaluation prior to a coloscopy. States she did have polyps in the past which were removed. No h/o AE/SE to anesthesia. ET unlimited. \par \par 11/4/22 ECG: NSR at 64 bpm, nl axis, poor RWP \par 3/18/22 ECG: NSR at 69 bpm, nl axis, poor RWP \par 9/17/21 ECG: sinus bradycardia at 57bpm, nl axis \par ECG: NSR at 61 bpm, nl axis

## 2022-11-09 NOTE — HISTORY OF PRESENT ILLNESS
[FreeTextEntry1] : 72F w/  DM, HTN, HLD recently admitted to [facility] 2/28/21 w/ new onset Atrial fibrillation w/ RVR. Was experiencing palpitations w/ dizziness x 2 days prior to proceeding to hospital. Pt is s/p SARA/DCCV 3/1/21 w/ subsequent return to NSR since. States since DC'd, no recurrence of symptoms. Started on BB and Eliquis during hospitalization, no c/o abnl bleeding. States ET is unlimited, no excess caffeine, never smoker. No h/o SUMMERS or chest pain.  Previously stated she had unlimited ET however  had a fall on the street after feeling dizzy. No syncope. A similar episode occurred 2/8/22 and she had a fall in the shower. Results of repeat Event monitor discussed previously, no recurrence of afib seen. Returns today for 6 month f/u, no new complaints. States she has not had any recurrence of dizziness or syncope. \par \par \par 3/18/22 ECG: NSR at 69 bpm, nl axis, poor RWP \par 9/17/21 ECG: sinus bradycardia at 57bpm, nl axis \par ECG: NSR at 61 bpm, nl axis

## 2023-04-06 ENCOUNTER — APPOINTMENT (OUTPATIENT)
Dept: HEART AND VASCULAR | Facility: CLINIC | Age: 73
End: 2023-04-06
Payer: MEDICARE

## 2023-04-06 ENCOUNTER — NON-APPOINTMENT (OUTPATIENT)
Age: 73
End: 2023-04-06

## 2023-04-06 VITALS
SYSTOLIC BLOOD PRESSURE: 123 MMHG | DIASTOLIC BLOOD PRESSURE: 84 MMHG | HEIGHT: 60 IN | BODY MASS INDEX: 36.12 KG/M2 | HEART RATE: 80 BPM | WEIGHT: 184 LBS

## 2023-04-06 PROCEDURE — 93000 ELECTROCARDIOGRAM COMPLETE: CPT

## 2023-04-06 PROCEDURE — 99213 OFFICE O/P EST LOW 20 MIN: CPT

## 2023-04-12 NOTE — REVIEW OF SYSTEMS
[Negative] : Heme/Lymph [Fever] : no fever [Chills] : no chills [SOB] : no shortness of breath [Dyspnea on exertion] : not dyspnea during exertion [Chest Discomfort] : no chest discomfort [Palpitations] : no palpitations [Syncope] : no syncope [Cough] : no cough [Wheezing] : no wheezing

## 2023-04-12 NOTE — PHYSICAL EXAM
[General Appearance - Well Developed] : well developed [Normal Appearance] : normal appearance [Well Groomed] : well groomed [General Appearance - Well Nourished] : well nourished [No Deformities] : no deformities [General Appearance - In No Acute Distress] : no acute distress [Normal Conjunctiva] : the conjunctiva exhibited no abnormalities [Normal Oral Mucosa] : normal oral mucosa [Normal Jugular Venous V Waves Present] : normal jugular venous V waves present [5th Left ICS - MCL] : palpated at the 5th LICS in the midclavicular line [Normal] : normal [No Precordial Heave] : no precordial heave was noted [Normal Rate] : normal [Rhythm Regular] : regular [Normal S1] : normal S1 [Normal S2] : normal S2 [No Pitting Edema] : no pitting edema present [] : no respiratory distress [Respiration, Rhythm And Depth] : normal respiratory rhythm and effort [Exaggerated Use Of Accessory Muscles For Inspiration] : no accessory muscle use [Auscultation Breath Sounds / Voice Sounds] : lungs were clear to auscultation bilaterally [Abnormal Walk] : normal gait [Oriented To Time, Place, And Person] : oriented to person, place, and time [Impaired Insight] : insight and judgment were intact [Affect] : the affect was normal [Mood] : the mood was normal [No Anxiety] : not feeling anxious [Apical Thrill] : no thrill palpable at the apex [Click] : no click

## 2023-04-12 NOTE — HISTORY OF PRESENT ILLNESS
[FreeTextEntry1] : 72F w/ DM, HTN, and HLD admitted to [facility] on 2/28/21 w/ new onset atrial fibrillation w/ RVR. Was experiencing palpitations w/ dizziness x 2 days prior to proceeding to hospital. Pt is s/p SARA/DCCV 3/1/21 w/ subsequent return to NSR since. States since DC'd, no recurrence of symptoms. Started on BB and Eliquis during hospitalization, no c/o abnl bleeding. States ET is unlimited, no excess caffeine, never smoker. No h/o SUMMERS or chest pain. \par \par \par 11/4/22 OV: returns for preop cardiac evaluation prior to a coloscopy. States she did have polyps in the past which were removed. No h/o AE/SE to anesthesia. ET unlimited. \par 10/14/22 OV: returns today for 6 month f/u, no new complaints. States she has not had any recurrence of dizziness or syncope\par 4/8/22 OV: returns today for results of TTE and Event monitor. Unable to go for TTE appt. \par 3/18/22 OV: returns today for 6 month f/u. Previously stated she had unlimited ET however today states she had a fall on the street after feeling dizzy. No syncope. A similar episode occurred 2/8/22 and she had a fall in the shower. \par 9/17/21 OV: returns today for 6 month f/u. No new complaints Walks >10K a day.\par \par \par 11/4/22 ECG: NSR at 64 bpm, nl axis, poor RWP \par 3/18/22 ECG: NSR at 69 bpm, nl axis, poor RWP \par 9/17/21 ECG: sinus bradycardia at 57bpm, nl axis \par ECG: NSR at 61 bpm, nl axis

## 2023-04-12 NOTE — HISTORY OF PRESENT ILLNESS
[FreeTextEntry1] : 72 year old female with HTN, HLD, DMII and atrial fibrillation s/p SARA/cardioversion 2021, who presents for follow up.\par \par 3/2021 she experienced palpitations and dizziness and came to the ER and was in newly diagnosed atrial fibrillation.  She was started on metoprolol 25mg BID and eliquis 5mg BID and was ultimately cardioverted.  She is compliant with these medications.  She then wore an event monitor 3/2022 for 7 days with rates 48- and 9 beats of SVT.  She presents for follow up and overall feels well.  No palpitations,  SOB, lightheadedness, syncope, near syncope or chest pain. EF while in the hospital was 65%.  \par  \par

## 2023-04-12 NOTE — ADDENDUM
[FreeTextEntry1] : I, Anant Isidro, hereby attest that the medical record entry for this patient accurately reflects signatures/notations that I made on the Date of Service in my capacity as an Attending Physician when I treated/diagnosed the above patient. I do hereby attest that this information is true, accurate and complete to the best of my knowledge and I understand that any falsification, omission, or concealment of material fact may subject me to administrative, civil, or, criminal liability. I agree with the note as written by my PA in its entirety.\par I was present for the entire visit and supervised the entire visit and agree with the plan as outlined.\par \par \par I, Arjun Cook, am scribing for and the presence of Dr. Isidro the following sections: HPI, PMH,Family/social history, ROS, Physical Exam, Assessment / Plan.\par

## 2023-04-12 NOTE — CARDIOLOGY SUMMARY
[___] : [unfilled] [de-identified] : 10/7/21 sinus at 63bpm\par 4/7/22 SInus at 65\par 10/6/2022 NSR at 65bpm\par 4/6/23 sinus at 77bpm

## 2023-04-12 NOTE — DISCUSSION/SUMMARY
[EKG obtained to assist in diagnosis and management of assessed problem(s)] : EKG obtained to assist in diagnosis and management of assessed problem(s) [FreeTextEntry1] : 72 year old female with HTN, HLD, DMII and newly diagnosed atrial fibrillation 3/2021 s/p SARA/cardioversion, who presents for follow up.  We again discussed what atrial fibrillation is, the natural progression of this arrhythmia and the association with stroke.  She is maintained on Eliquis.  We discussed that it is unclear when atrial fibrillation comes back and at that time options include repeat cardioversion, rate control or an ablation.  She is not interested in an ablation at this time but will consider this in the future if she has other episodes.  She will follow up in 6 months or sooner if needed.  She knows to call with any questions or concerns.

## 2023-04-14 ENCOUNTER — APPOINTMENT (OUTPATIENT)
Dept: HEART AND VASCULAR | Facility: CLINIC | Age: 73
End: 2023-04-14
Payer: MEDICARE

## 2023-05-18 ENCOUNTER — NON-APPOINTMENT (OUTPATIENT)
Age: 73
End: 2023-05-18

## 2023-05-18 PROBLEM — E66.3 OVERWEIGHT (BMI 25.0-29.9): Status: ACTIVE | Noted: 2021-03-19

## 2023-05-19 ENCOUNTER — NON-APPOINTMENT (OUTPATIENT)
Age: 73
End: 2023-05-19

## 2023-05-19 ENCOUNTER — APPOINTMENT (OUTPATIENT)
Dept: HEART AND VASCULAR | Facility: CLINIC | Age: 73
End: 2023-05-19
Payer: MEDICARE

## 2023-05-19 VITALS
TEMPERATURE: 98.4 F | OXYGEN SATURATION: 94 % | HEIGHT: 66 IN | HEART RATE: 75 BPM | DIASTOLIC BLOOD PRESSURE: 74 MMHG | BODY MASS INDEX: 30.7 KG/M2 | SYSTOLIC BLOOD PRESSURE: 130 MMHG | WEIGHT: 191 LBS | RESPIRATION RATE: 14 BRPM

## 2023-05-19 DIAGNOSIS — E66.3 OVERWEIGHT: ICD-10-CM

## 2023-05-19 PROCEDURE — 99214 OFFICE O/P EST MOD 30 MIN: CPT

## 2023-05-19 PROCEDURE — 93000 ELECTROCARDIOGRAM COMPLETE: CPT

## 2023-05-24 LAB
ALBUMIN SERPL ELPH-MCNC: 4.7 G/DL
ALP BLD-CCNC: 99 U/L
ALT SERPL-CCNC: 18 U/L
ANION GAP SERPL CALC-SCNC: 11 MMOL/L
APO LP(A) SERPL-MCNC: 25.6 NMOL/L
AST SERPL-CCNC: 22 U/L
BILIRUB SERPL-MCNC: 0.3 MG/DL
BUN SERPL-MCNC: 14 MG/DL
CALCIUM SERPL-MCNC: 10.8 MG/DL
CHLORIDE SERPL-SCNC: 104 MMOL/L
CHOLEST SERPL-MCNC: 154 MG/DL
CK SERPL-CCNC: 137 U/L
CO2 SERPL-SCNC: 28 MMOL/L
CREAT SERPL-MCNC: 0.79 MG/DL
EGFR: 79 ML/MIN/1.73M2
ESTIMATED AVERAGE GLUCOSE: 131 MG/DL
GLUCOSE SERPL-MCNC: 105 MG/DL
HBA1C MFR BLD HPLC: 6.2 %
HDLC SERPL-MCNC: 43 MG/DL
LDLC SERPL CALC-MCNC: 72 MG/DL
NONHDLC SERPL-MCNC: 111 MG/DL
POTASSIUM SERPL-SCNC: 4.5 MMOL/L
PROT SERPL-MCNC: 7.1 G/DL
SODIUM SERPL-SCNC: 143 MMOL/L
TRIGL SERPL-MCNC: 193 MG/DL
TSH SERPL-ACNC: 0.67 UIU/ML

## 2023-05-31 NOTE — HISTORY OF PRESENT ILLNESS
[FreeTextEntry1] : 72F w/ DM, HTN, and HLD admitted to [facility] on 2/28/21 w/ new onset atrial fibrillation w/ RVR. Was experiencing palpitations w/ dizziness x 2 days prior to proceeding to hospital. Pt is s/p SARA/DCCV 3/1/21 w/ subsequent return to NSR since. States since DC'd, no recurrence of symptoms. Started on BB and Eliquis during hospitalization, no c/o abnl bleeding. States ET is unlimited, no excess caffeine, never smoker. No h/o SUMMERS or chest pain. \par \par \par 5/19/23 OV: returns today for f/u, no new cardiac complaints or abnl bleeding. Does report diffuse muscle pain, especially in her arms. States her PCP started on her baclofen to help with the discomfort. \par 11/4/22 OV: returns for preop cardiac evaluation prior to a coloscopy. States she did have polyps in the past which were removed. No h/o AE/SE to anesthesia. ET unlimited. \par 10/14/22 OV: returns today for 6 month f/u, no new complaints. States she has not had any recurrence of dizziness or syncope\par 4/8/22 OV: returns today for results of TTE and Event monitor. Unable to go for TTE appt. \par 3/18/22 OV: returns today for 6 month f/u. Previously stated she had unlimited ET however today states she had a fall on the street after feeling dizzy. No syncope. A similar episode occurred 2/8/22 and she had a fall in the shower. \par 9/17/21 OV: returns today for 6 month f/u. No new complaints Walks >10K a day.\par \par \par 5/19/23 ECG: BSR at 67 bpm \par 11/4/22 ECG: NSR at 64 bpm, nl axis, poor RWP \par 3/18/22 ECG: NSR at 69 bpm, nl axis, poor RWP \par 9/17/21 ECG: sinus bradycardia at 57bpm, nl axis \par ECG: NSR at 61 bpm, nl axis

## 2023-05-31 NOTE — ASSESSMENT
[FreeTextEntry1] : 1. Atrial fibrillation \par - now in NSR\par - cont Eliquis and Toprol\par - seen by EP for f/u 4/2021, stable\par - TTE 9/17/21 c/w nl LV/RV fxn, mild MR/AI, mild to mod TR, PASP 32 mmHg\par - repeat TTE c/w nl EF, MVP\par - results discussed in detail with pt \par - start ASA 81 qd after colonoscopy\par - repeat TTE ordered\par - MPI ordered\par \par 2. muscle pain\par - cpk ordered\par \par 3. Dizziness\par - Event Monitor short runs of SVT\par - results discussed in detail with pt \par - CT head wnl\par \par 4. HTN\par - suboptimally controlled\par - increase lisin/HCT 10/12.5 qd to 20/12.5 qd; ed done re SE/AE\par - continue rest of current meds for now \par - repeat TTE ordered\par \par 5. HLD\par - stable\par - cont statin\par - repeat labs\par \par 6. DM\par - stable \par - continue current meds for now\par - repeat labs\par \par 7. Overweight\par - BMI 31\par - ed done re heart healthy lifestyle modifications and diet \par \par RTC 3-4 weeks via TH\par . \par

## 2023-06-12 ENCOUNTER — APPOINTMENT (OUTPATIENT)
Dept: HEART AND VASCULAR | Facility: CLINIC | Age: 73
End: 2023-06-12
Payer: MEDICARE

## 2023-06-12 DIAGNOSIS — Z86.79 PERSONAL HISTORY OF OTHER DISEASES OF THE CIRCULATORY SYSTEM: ICD-10-CM

## 2023-06-12 PROCEDURE — 93306 TTE W/DOPPLER COMPLETE: CPT

## 2023-06-29 PROBLEM — Z86.79 HISTORY OF MITRAL VALVE PROLAPSE: Status: RESOLVED | Noted: 2022-11-09 | Resolved: 2023-06-29

## 2023-06-29 NOTE — HISTORY OF PRESENT ILLNESS
[FreeTextEntry1] : 73F w/ DM, HTN, and HLD admitted to [facility] on 2/28/21 w/ new onset atrial fibrillation w/ RVR. Was experiencing palpitations w/ dizziness x 2 days prior to proceeding to hospital. Pt is s/p SARA/DCCV 3/1/21 w/ subsequent return to NSR since. States since DC'd, no recurrence of symptoms. Started on BB and Eliquis during hospitalization, no c/o abnl bleeding. States ET is unlimited, no excess caffeine, never smoker. No h/o SUMMERS or chest pain. \par \par \par 6/30/23 OV:\par 5/19/23 OV: returns today for f/u, no new cardiac complaints or abnl bleeding. Does report diffuse muscle pain, especially in her arms. States her PCP started on her baclofen to help with the discomfort. \par 11/4/22 OV: returns for preop cardiac evaluation prior to a coloscopy. States she did have polyps in the past which were removed. No h/o AE/SE to anesthesia. ET unlimited. \par 10/14/22 OV: returns today for 6 month f/u, no new complaints. States she has not had any recurrence of dizziness or syncope\par 4/8/22 OV: returns today for results of TTE and Event monitor. Unable to go for TTE appt. \par 3/18/22 OV: returns today for 6 month f/u. Previously stated she had unlimited ET however today states she had a fall on the street after feeling dizzy. No syncope. A similar episode occurred 2/8/22 and she had a fall in the shower. \par 9/17/21 OV: returns today for 6 month f/u. No new complaints Walks >10K a day.\par \par \par 5/19/23 ECG: NSR at 67 bpm \par 11/4/22 ECG: NSR at 64 bpm, nl axis, poor RWP \par 3/18/22 ECG: NSR at 69 bpm, nl axis, poor RWP \par 9/17/21 ECG: sinus bradycardia at 57bpm, nl axis \par ECG: NSR at 61 bpm, nl axis

## 2023-06-30 ENCOUNTER — APPOINTMENT (OUTPATIENT)
Dept: HEART AND VASCULAR | Facility: CLINIC | Age: 73
End: 2023-06-30

## 2023-07-21 ENCOUNTER — APPOINTMENT (OUTPATIENT)
Dept: HEART AND VASCULAR | Facility: CLINIC | Age: 73
End: 2023-07-21
Payer: MEDICARE

## 2023-07-21 DIAGNOSIS — E78.5 HYPERLIPIDEMIA, UNSPECIFIED: ICD-10-CM

## 2023-07-21 DIAGNOSIS — I10 ESSENTIAL (PRIMARY) HYPERTENSION: ICD-10-CM

## 2023-07-21 DIAGNOSIS — E11.9 TYPE 2 DIABETES MELLITUS W/OUT COMPLICATIONS: ICD-10-CM

## 2023-07-21 PROCEDURE — 99443: CPT

## 2023-08-08 PROBLEM — E11.9 DIABETES MELLITUS: Status: ACTIVE | Noted: 2021-03-19

## 2023-08-08 PROBLEM — E78.5 HLD (HYPERLIPIDEMIA): Status: ACTIVE | Noted: 2021-03-19

## 2023-08-08 PROBLEM — I10 BENIGN ESSENTIAL HTN: Status: ACTIVE | Noted: 2021-03-19

## 2023-08-29 NOTE — HISTORY OF PRESENT ILLNESS
[FreeTextEntry1] : 73F w/ DM, HTN, and HLD admitted to [facility] on 2/28/21 w/ new onset atrial fibrillation w/ RVR. Was experiencing palpitations w/ dizziness x 2 days prior to proceeding to hospital. Pt is s/p SARA/DCCV 3/1/21 w/ subsequent return to NSR since. States since DC'd, no recurrence of symptoms. Started on BB and Eliquis during hospitalization, no c/o abnl bleeding. States ET is unlimited, no excess caffeine, never smoker. No h/o SUMMERS or chest pain.    7/21/23 TH: pt returns today via TH for results of labs, TTE, and MPI. No new complaints. 5/19/23 OV: returns today for f/u, no new cardiac complaints or abnl bleeding. Does report diffuse muscle pain, especially in her arms. States her PCP started on her baclofen to help with the discomfort.  11/4/22 OV: returns for preop cardiac evaluation prior to a coloscopy. States she did have polyps in the past which were removed. No h/o AE/SE to anesthesia. ET unlimited.  10/14/22 OV: returns today for 6 month f/u, no new complaints. States she has not had any recurrence of dizziness or syncope 4/8/22 OV: returns today for results of TTE and Event monitor. Unable to go for TTE appt.  3/18/22 OV: returns today for 6 month f/u. Previously stated she had unlimited ET however today states she had a fall on the street after feeling dizzy. No syncope. A similar episode occurred 2/8/22 and she had a fall in the shower.  9/17/21 OV: returns today for 6 month f/u. No new complaints Walks >10K a day.   5/19/23 ECG: NSR at 67 bpm  11/4/22 ECG: NSR at 64 bpm, nl axis, poor RWP  3/18/22 ECG: NSR at 69 bpm, nl axis, poor RWP  9/17/21 ECG: sinus bradycardia at 57bpm, nl axis  ECG: NSR at 61 bpm, nl axis

## 2023-08-29 NOTE — ASSESSMENT
[FreeTextEntry1] : 1. Atrial fibrillation  - now in NSR - cont Eliquis, ASA, and Toprol - seen by EP for f/u 4/2021, stable - TTE 9/17/21 c/w nl LV/RV fxn, mild MR/AI, mild to mod TR, PASP 32 mmHg - repeat 6/12/23 TTE c/w nl LV sys fxn, mild LV diastolic dysfxn, mild MR/AI/TR, PASP 33 mmHg - MPI still pending  2. muscle pain - cpk wnl  3. Dizziness - Event Monitor short runs of SVT - results discussed in detail with pt  - CT head wnl  4. HTN - suboptimally controlled - increase lisin/HCT 10/12.5 qd to 20/12.5 qd; ed done re SE/AE - continue rest of current meds for now  - repeat 6/12/23 TTE c/w nl LV sys fxn, mild LV diastolic dysfxn, mild MR/AI/TR, PASP 33 mmHg  5. HLD - stable - cont statin - repeat labs stable  6. DM - stable  - continue current meds for now - A1C now 6.2  7. Overweight - BMI 31 - ed done re heart healthy lifestyle modifications and diet   Spent 22 minutes on telehealth/phone visit with pt, all questions answered in detail.

## 2023-10-26 ENCOUNTER — NON-APPOINTMENT (OUTPATIENT)
Age: 73
End: 2023-10-26

## 2023-10-26 ENCOUNTER — APPOINTMENT (OUTPATIENT)
Dept: HEART AND VASCULAR | Facility: CLINIC | Age: 73
End: 2023-10-26
Payer: MEDICARE

## 2023-10-26 VITALS
OXYGEN SATURATION: 96 % | WEIGHT: 190 LBS | DIASTOLIC BLOOD PRESSURE: 73 MMHG | BODY MASS INDEX: 30.53 KG/M2 | HEART RATE: 90 BPM | SYSTOLIC BLOOD PRESSURE: 126 MMHG | HEIGHT: 66 IN

## 2023-10-26 DIAGNOSIS — Z01.810 ENCOUNTER FOR PREPROCEDURAL CARDIOVASCULAR EXAMINATION: ICD-10-CM

## 2023-10-26 DIAGNOSIS — I48.91 UNSPECIFIED ATRIAL FIBRILLATION: ICD-10-CM

## 2023-10-26 PROCEDURE — 99214 OFFICE O/P EST MOD 30 MIN: CPT

## 2023-10-26 PROCEDURE — 93000 ELECTROCARDIOGRAM COMPLETE: CPT

## 2023-11-01 PROBLEM — I48.91 AFIB: Status: ACTIVE | Noted: 2021-03-19

## 2023-11-01 PROBLEM — Z01.810 PREOPERATIVE CARDIOVASCULAR EXAMINATION: Status: ACTIVE | Noted: 2022-11-09

## 2023-12-20 RX ORDER — LISINOPRIL AND HYDROCHLOROTHIAZIDE TABLETS 20; 12.5 MG/1; MG/1
20-12.5 TABLET ORAL DAILY
Qty: 90 | Refills: 1 | Status: ACTIVE | COMMUNITY
Start: 2021-09-17 | End: 1900-01-01

## 2023-12-20 RX ORDER — ATORVASTATIN CALCIUM 40 MG/1
40 TABLET, FILM COATED ORAL
Qty: 90 | Refills: 1 | Status: ACTIVE | COMMUNITY
Start: 1900-01-01 | End: 1900-01-01

## 2024-01-12 NOTE — ED PROVIDER NOTE - BIRTH SEX
Goal Outcome Evaluation:      VSS. 2 units of blood, first bag currently infusing. Multiple TDC dressing changes due to draining blood from site even after desmopressin administration. H&H ordered see results. MD Mora notified and ordered RN to continue pressure dressings and replace blood.                                         Female

## 2024-02-21 ENCOUNTER — RX RENEWAL (OUTPATIENT)
Age: 74
End: 2024-02-21

## 2024-02-21 RX ORDER — METOPROLOL SUCCINATE 50 MG/1
50 TABLET, EXTENDED RELEASE ORAL DAILY
Qty: 90 | Refills: 1 | Status: ACTIVE | COMMUNITY
Start: 1900-01-01 | End: 1900-01-01

## 2024-03-06 ENCOUNTER — RX RENEWAL (OUTPATIENT)
Age: 74
End: 2024-03-06

## 2024-03-06 RX ORDER — APIXABAN 5 MG/1
5 TABLET, FILM COATED ORAL
Qty: 60 | Refills: 3 | Status: ACTIVE | COMMUNITY
Start: 1900-01-01 | End: 1900-01-01

## 2024-09-04 ENCOUNTER — APPOINTMENT (OUTPATIENT)
Dept: HEART AND VASCULAR | Facility: CLINIC | Age: 74
End: 2024-09-04
Payer: MEDICARE

## 2024-09-04 ENCOUNTER — NON-APPOINTMENT (OUTPATIENT)
Age: 74
End: 2024-09-04

## 2024-09-04 VITALS — BODY MASS INDEX: 28.45 KG/M2 | WEIGHT: 177 LBS | HEIGHT: 66 IN

## 2024-09-04 VITALS — DIASTOLIC BLOOD PRESSURE: 63 MMHG | TEMPERATURE: 97.8 F | SYSTOLIC BLOOD PRESSURE: 110 MMHG

## 2024-09-04 DIAGNOSIS — I48.91 UNSPECIFIED ATRIAL FIBRILLATION: ICD-10-CM

## 2024-09-04 PROCEDURE — 99213 OFFICE O/P EST LOW 20 MIN: CPT

## 2024-09-04 PROCEDURE — 93000 ELECTROCARDIOGRAM COMPLETE: CPT

## 2024-09-09 NOTE — CARDIOLOGY SUMMARY
[___] : [unfilled] [de-identified] : 10/7/21 sinus at 63bpm 4/7/22 SInus at 65 10/6/2022 NSR at 65bpm 4/6/23 sinus at 77bpm 10/26/23 Sinus at  9/4/24 sinus at 60

## 2024-09-09 NOTE — DISCUSSION/SUMMARY
[FreeTextEntry1] : 74 year old female with HTN, HLD, DMII and newly diagnosed atrial fibrillation 3/2021 s/p SARA/cardioversion, who presents for follow up.  We again discussed what atrial fibrillation is, the natural progression of this arrhythmia and the association with stroke.  She is maintained on Eliquis.  We discussed that it is unclear when atrial fibrillation comes back and at that time options include repeat cardioversion, rate control or an ablation.  She is not interested in an ablation at this time but will consider this in the future if she has other episodes. She will follow up in 6 months or sooner if needed.  She knows to call with any questions or concerns.   [EKG obtained to assist in diagnosis and management of assessed problem(s)] : EKG obtained to assist in diagnosis and management of assessed problem(s)

## 2024-09-09 NOTE — HISTORY OF PRESENT ILLNESS
[FreeTextEntry1] : 74 year old female with HTN, HLD, DMII and atrial fibrillation s/p SARA/cardioversion 2021, who presents for follow up.  3/2021 she experienced palpitations and dizziness and came to the ER and was in newly diagnosed atrial fibrillation.  She was started on metoprolol 25mg BID and eliquis 5mg BID and was ultimately cardioverted.  She is compliant with these medications.  She then wore an event monitor 3/2022 for 7 days with rates [id number] and 9 beats of SVT.  She presents for follow up and overall feels well.  No palpitations, SOB, lightheadedness, syncope, near syncope or chest pain. EF while in the hospital was 65%.

## 2024-09-09 NOTE — PHYSICAL EXAM
[General Appearance - Well Developed] : well developed [Normal Appearance] : normal appearance [Well Groomed] : well groomed [General Appearance - Well Nourished] : well nourished [No Deformities] : no deformities [General Appearance - In No Acute Distress] : no acute distress [Normal Conjunctiva] : the conjunctiva exhibited no abnormalities [Normal Oral Mucosa] : normal oral mucosa [Normal Jugular Venous V Waves Present] : normal jugular venous V waves present [5th Left ICS - MCL] : palpated at the 5th LICS in the midclavicular line [Normal] : normal [No Precordial Heave] : no precordial heave was noted [Apical Thrill] : no thrill palpable at the apex [Normal Rate] : normal [Rhythm Regular] : regular [Normal S1] : normal S1 [Normal S2] : normal S2 [Click] : no click [No Pitting Edema] : no pitting edema present [] : no respiratory distress [Respiration, Rhythm And Depth] : normal respiratory rhythm and effort [Exaggerated Use Of Accessory Muscles For Inspiration] : no accessory muscle use [Auscultation Breath Sounds / Voice Sounds] : lungs were clear to auscultation bilaterally [Abnormal Walk] : normal gait [Oriented To Time, Place, And Person] : oriented to person, place, and time [Impaired Insight] : insight and judgment were intact [Affect] : the affect was normal [No Anxiety] : not feeling anxious [Mood] : the mood was normal

## 2024-09-09 NOTE — CARDIOLOGY SUMMARY
[___] : [unfilled] [de-identified] : 10/7/21 sinus at 63bpm 4/7/22 SInus at 65 10/6/2022 NSR at 65bpm 4/6/23 sinus at 77bpm 10/26/23 Sinus at  9/4/24 sinus at 60

## 2024-09-09 NOTE — HISTORY OF PRESENT ILLNESS
[FreeTextEntry1] : 74 year old female with HTN, HLD, DMII and atrial fibrillation s/p SARA/cardioversion 2021, who presents for follow up.  3/2021 she experienced palpitations and dizziness and came to the ER and was in newly diagnosed atrial fibrillation.  She was started on metoprolol 25mg BID and eliquis 5mg BID and was ultimately cardioverted.  She is compliant with these medications.  She then wore an event monitor 3/2022 for 7 days with rates [id number] and 9 beats of SVT.  She presents for follow up and overall feels well.  No palpitations, SOB, lightheadedness, syncope, near syncope or chest pain. EF while in the hospital was 65%.

## 2024-09-09 NOTE — PHYSICAL EXAM
[General Appearance - Well Developed] : well developed [Normal Appearance] : normal appearance [Well Groomed] : well groomed [General Appearance - Well Nourished] : well nourished [No Deformities] : no deformities [Normal Conjunctiva] : the conjunctiva exhibited no abnormalities [General Appearance - In No Acute Distress] : no acute distress [Normal Oral Mucosa] : normal oral mucosa [Normal Jugular Venous V Waves Present] : normal jugular venous V waves present [5th Left ICS - MCL] : palpated at the 5th LICS in the midclavicular line [Normal] : normal [No Precordial Heave] : no precordial heave was noted [Apical Thrill] : no thrill palpable at the apex [Normal Rate] : normal [Rhythm Regular] : regular [Normal S1] : normal S1 [Normal S2] : normal S2 [Click] : no click [No Pitting Edema] : no pitting edema present [] : no respiratory distress [Respiration, Rhythm And Depth] : normal respiratory rhythm and effort [Exaggerated Use Of Accessory Muscles For Inspiration] : no accessory muscle use [Auscultation Breath Sounds / Voice Sounds] : lungs were clear to auscultation bilaterally [Abnormal Walk] : normal gait [Oriented To Time, Place, And Person] : oriented to person, place, and time [Impaired Insight] : insight and judgment were intact [Affect] : the affect was normal [No Anxiety] : not feeling anxious [Mood] : the mood was normal

## 2024-09-09 NOTE — HISTORY OF PRESENT ILLNESS
[FreeTextEntry1] : 74 year old female with HTN, HLD, DMII and atrial fibrillation s/p SARA/cardioversion 2021, who presents for follow up.  3/2021 she experienced palpitations and dizziness and came to the ER and was in newly diagnosed atrial fibrillation.  She was started on metoprolol 25mg BID and eliquis 5mg BID and was ultimately cardioverted.  She is compliant with these medications.  She then wore an event monitor 3/2022 for 7 days with rates [id number] and 9 beats of SVT.  She presents for follow up and overall feels well.  No palpitations, SOB, lightheadedness, syncope, near syncope or chest pain. EF while in the hospital was 65%.

## 2024-09-09 NOTE — REVIEW OF SYSTEMS
[Wheezing] : no wheezing [Negative] : Heme/Lymph [Fever] : no fever [Weight Gain (___ Lbs)] : no recent weight gain [Chills] : no chills [Feeling Fatigued] : not feeling fatigued [Weight Loss (___ Lbs)] : no recent weight loss [SOB] : no shortness of breath [Dyspnea on exertion] : not dyspnea during exertion [Chest Discomfort] : no chest discomfort [Lower Ext Edema] : no extremity edema [Palpitations] : no palpitations [Syncope] : no syncope [Cough] : no cough

## 2024-09-09 NOTE — CARDIOLOGY SUMMARY
[___] : [unfilled] [de-identified] : 10/7/21 sinus at 63bpm 4/7/22 SInus at 65 10/6/2022 NSR at 65bpm 4/6/23 sinus at 77bpm 10/26/23 Sinus at  9/4/24 sinus at 60

## 2024-11-21 PROBLEM — E66.3 PATIENT OVERWEIGHT: Status: ACTIVE | Noted: 2021-03-19

## 2024-11-22 ENCOUNTER — NON-APPOINTMENT (OUTPATIENT)
Age: 74
End: 2024-11-22

## 2024-11-22 ENCOUNTER — APPOINTMENT (OUTPATIENT)
Dept: HEART AND VASCULAR | Facility: CLINIC | Age: 74
End: 2024-11-22

## 2024-11-22 VITALS
HEIGHT: 66 IN | WEIGHT: 179.99 LBS | OXYGEN SATURATION: 97 % | SYSTOLIC BLOOD PRESSURE: 131 MMHG | BODY MASS INDEX: 28.93 KG/M2 | DIASTOLIC BLOOD PRESSURE: 82 MMHG | TEMPERATURE: 98.3 F | HEART RATE: 70 BPM

## 2024-11-22 DIAGNOSIS — E78.5 HYPERLIPIDEMIA, UNSPECIFIED: ICD-10-CM

## 2024-11-22 DIAGNOSIS — E11.9 TYPE 2 DIABETES MELLITUS W/OUT COMPLICATIONS: ICD-10-CM

## 2024-11-22 DIAGNOSIS — M54.9 DORSALGIA, UNSPECIFIED: ICD-10-CM

## 2024-11-22 DIAGNOSIS — I48.91 UNSPECIFIED ATRIAL FIBRILLATION: ICD-10-CM

## 2024-11-22 DIAGNOSIS — I10 ESSENTIAL (PRIMARY) HYPERTENSION: ICD-10-CM

## 2024-11-22 DIAGNOSIS — E66.3 OVERWEIGHT: ICD-10-CM

## 2024-11-22 PROCEDURE — 99214 OFFICE O/P EST MOD 30 MIN: CPT

## 2024-11-22 PROCEDURE — 93000 ELECTROCARDIOGRAM COMPLETE: CPT

## 2024-11-22 PROCEDURE — G2211 COMPLEX E/M VISIT ADD ON: CPT

## 2024-11-25 LAB
ALBUMIN SERPL ELPH-MCNC: 4.6 G/DL
ALP BLD-CCNC: 110 U/L
ALT SERPL-CCNC: 17 U/L
ANION GAP SERPL CALC-SCNC: 13 MMOL/L
AST SERPL-CCNC: 20 U/L
BASOPHILS # BLD AUTO: 0.01 K/UL
BASOPHILS NFR BLD AUTO: 0.2 %
BILIRUB SERPL-MCNC: 0.3 MG/DL
BUN SERPL-MCNC: 15 MG/DL
CALCIUM SERPL-MCNC: 11.4 MG/DL
CHLORIDE SERPL-SCNC: 100 MMOL/L
CHOLEST SERPL-MCNC: 127 MG/DL
CO2 SERPL-SCNC: 28 MMOL/L
CREAT SERPL-MCNC: 0.89 MG/DL
CRP SERPL HS-MCNC: <0.15 MG/L
CRP SERPL-MCNC: <3 MG/L
EGFR: 68 ML/MIN/1.73M2
EOSINOPHIL # BLD AUTO: 0.1 K/UL
EOSINOPHIL NFR BLD AUTO: 2.2 %
ERYTHROCYTE [SEDIMENTATION RATE] IN BLOOD BY WESTERGREN METHOD: 14 MM/HR
ESTIMATED AVERAGE GLUCOSE: 126 MG/DL
GLUCOSE SERPL-MCNC: 86 MG/DL
HBA1C MFR BLD HPLC: 6 %
HCT VFR BLD CALC: 45.8 %
HDLC SERPL-MCNC: 43 MG/DL
HGB BLD-MCNC: 14.4 G/DL
IMM GRANULOCYTES NFR BLD AUTO: 0.4 %
LDLC SERPL CALC-MCNC: 58 MG/DL
LYMPHOCYTES # BLD AUTO: 1.83 K/UL
LYMPHOCYTES NFR BLD AUTO: 39.4 %
MAN DIFF?: NORMAL
MCHC RBC-ENTMCNC: 30.8 PG
MCHC RBC-ENTMCNC: 31.4 G/DL
MCV RBC AUTO: 98.1 FL
MONOCYTES # BLD AUTO: 0.45 K/UL
MONOCYTES NFR BLD AUTO: 9.7 %
NEUTROPHILS # BLD AUTO: 2.23 K/UL
NEUTROPHILS NFR BLD AUTO: 48.1 %
NONHDLC SERPL-MCNC: 84 MG/DL
PLATELET # BLD AUTO: 349 K/UL
POTASSIUM SERPL-SCNC: 4.8 MMOL/L
PROT SERPL-MCNC: 7.4 G/DL
RBC # BLD: 4.67 M/UL
RBC # FLD: 14.4 %
SODIUM SERPL-SCNC: 141 MMOL/L
TRIGL SERPL-MCNC: 154 MG/DL
TSH SERPL-ACNC: 1 UIU/ML
WBC # FLD AUTO: 4.64 K/UL

## 2024-12-10 ENCOUNTER — APPOINTMENT (OUTPATIENT)
Dept: HEART AND VASCULAR | Facility: CLINIC | Age: 74
End: 2024-12-10

## 2024-12-10 VITALS — OXYGEN SATURATION: 97 % | HEART RATE: 61 BPM | SYSTOLIC BLOOD PRESSURE: 118 MMHG | DIASTOLIC BLOOD PRESSURE: 70 MMHG

## 2024-12-10 PROCEDURE — 93306 TTE W/DOPPLER COMPLETE: CPT

## 2024-12-16 ENCOUNTER — APPOINTMENT (OUTPATIENT)
Dept: HEART AND VASCULAR | Facility: CLINIC | Age: 74
End: 2024-12-16

## 2024-12-16 DIAGNOSIS — E11.9 TYPE 2 DIABETES MELLITUS W/OUT COMPLICATIONS: ICD-10-CM

## 2024-12-16 DIAGNOSIS — E78.5 HYPERLIPIDEMIA, UNSPECIFIED: ICD-10-CM

## 2024-12-16 DIAGNOSIS — M54.9 DORSALGIA, UNSPECIFIED: ICD-10-CM

## 2024-12-16 DIAGNOSIS — E66.3 OVERWEIGHT: ICD-10-CM

## 2024-12-16 PROCEDURE — 99442: CPT | Mod: 93

## 2025-03-14 NOTE — H&P ADULT - HISTORY OF PRESENT ILLNESS
70 Y F with PMH DM II presents to ED 2/28/21 for an episode of palpitations and SOB at 9pm. Both self resolved prior to arrival to ED.     Pt denies CP, SUMMERS, PND, LE edema, N/V/D, dizziness, syncope, fever, chills, melena, hematuria, recent sick contact or recent travel.     In ED T 98.2, HR 96 bpm, /90, RR 18, O2 100% on RA.  Labs significant for trop negative x1, COVID pending.   EKG: afib @ 116 bpm.  s/p lopressor 5 mg IV x1     Pt admitted to cardiac tele for management of new afib.  70 Y F with PMH ______  presents to ED 2/28/21 for an episode of palpitations, dizziness and SOB at 9pm yesterday. Symptoms self resolved prior to arrival to ED.     Pt denies CP, SUMMERS, PND, LE edema, N/V/D, syncope, fever, chills, melena, hematuria, recent sick contact or recent travel.     In ED T 98.2, HR 96 bpm, /90, RR 18, O2 100% on RA.  Labs significant for trop negative x1, COVID pending.   EKG: afib @ 116 bpm.  s/p lopressor 5 mg IV x1     Pt admitted to cardiac tele for management of new afib.  Calm/Appropriate 70 Y F with PMH HLD, DM II who presents to ED 2/28/21 for an episode of palpitations, dizziness and SOB at 9pm yesterday. Symptoms self resolved prior to arrival to ED.     Pt denies CP, SUMMERS, PND, LE edema, N/V/D, syncope, fever, chills, melena, hematuria, recent sick contact or recent travel.     In ED T 98.2, HR 96 bpm, /90, RR 18, O2 100% on RA.  Labs significant for trop negative x1, COVID pending. CXR ED read: normal.   EKG: afib @ 116 bpm.  s/p lopressor 5 mg IV x1 with resulting HR 80-90's and Eliquis 5 mg po X1.     Pt admitted to cardiac tele for management of new afib.  70 Y F with PMH HLD, DM II who presents to ED 2/28/21 for an episode of palpitations, dizziness and SOB at 9pm Friday when in bed. Pt took an aspirin 81 mg x1 and symptoms resolved after a few minutes. Pt with intermittent dizziness without syncope throughout day with elevated blood pressures with SBP ranging 130-150's and one reading of 200 prompting ED visit. Pt denies CP, SUMMERS, PND, LE edema, N/V/D, syncope, fever, chills, melena, hematuria, recent sick contact or recent travel.  In ED T 98.2, HR 96 bpm, /90, RR 18, O2 100% on RA.  Labs significant for trop negative x1, COVID pending. CXR ED read: normal. CT head non con: normal.  EKG: afib @ 116 bpm.  s/p lopressor 5 mg IV x1 with resulting HR 80-90's and Eliquis 5 mg po X1.  Pt admitted to cardiac tele for management of new afib.

## 2025-03-26 ENCOUNTER — APPOINTMENT (OUTPATIENT)
Dept: HEART AND VASCULAR | Facility: CLINIC | Age: 75
End: 2025-03-26
Payer: MEDICARE

## 2025-03-26 ENCOUNTER — NON-APPOINTMENT (OUTPATIENT)
Age: 75
End: 2025-03-26

## 2025-03-26 VITALS
SYSTOLIC BLOOD PRESSURE: 108 MMHG | OXYGEN SATURATION: 98 % | BODY MASS INDEX: 28.93 KG/M2 | DIASTOLIC BLOOD PRESSURE: 73 MMHG | HEART RATE: 65 BPM | HEIGHT: 66 IN | WEIGHT: 180 LBS | TEMPERATURE: 95.8 F

## 2025-03-26 DIAGNOSIS — I48.91 UNSPECIFIED ATRIAL FIBRILLATION: ICD-10-CM

## 2025-03-26 PROCEDURE — 93000 ELECTROCARDIOGRAM COMPLETE: CPT

## 2025-03-26 PROCEDURE — 93246 EXT ECG>7D<15D RECORDING: CPT

## 2025-03-26 PROCEDURE — 99213 OFFICE O/P EST LOW 20 MIN: CPT

## 2025-04-16 PROCEDURE — 93248 EXT ECG>7D<15D REV&INTERPJ: CPT

## 2025-05-16 ENCOUNTER — NON-APPOINTMENT (OUTPATIENT)
Age: 75
End: 2025-05-16

## 2025-05-16 ENCOUNTER — APPOINTMENT (OUTPATIENT)
Dept: HEART AND VASCULAR | Facility: CLINIC | Age: 75
End: 2025-05-16

## 2025-05-16 VITALS
OXYGEN SATURATION: 97 % | BODY MASS INDEX: 28.93 KG/M2 | TEMPERATURE: 98.9 F | HEIGHT: 66 IN | WEIGHT: 179.99 LBS | HEART RATE: 74 BPM | SYSTOLIC BLOOD PRESSURE: 100 MMHG | DIASTOLIC BLOOD PRESSURE: 78 MMHG

## 2025-05-16 DIAGNOSIS — E78.5 HYPERLIPIDEMIA, UNSPECIFIED: ICD-10-CM

## 2025-05-16 DIAGNOSIS — I10 ESSENTIAL (PRIMARY) HYPERTENSION: ICD-10-CM

## 2025-05-16 DIAGNOSIS — E66.3 OVERWEIGHT: ICD-10-CM

## 2025-05-16 DIAGNOSIS — I48.91 UNSPECIFIED ATRIAL FIBRILLATION: ICD-10-CM

## 2025-05-16 DIAGNOSIS — E11.9 TYPE 2 DIABETES MELLITUS W/OUT COMPLICATIONS: ICD-10-CM

## 2025-05-16 PROCEDURE — 99214 OFFICE O/P EST MOD 30 MIN: CPT

## 2025-05-16 PROCEDURE — G2211 COMPLEX E/M VISIT ADD ON: CPT

## 2025-05-16 PROCEDURE — 93000 ELECTROCARDIOGRAM COMPLETE: CPT

## 2025-05-19 LAB
ALBUMIN SERPL ELPH-MCNC: 4.7 G/DL
ALP BLD-CCNC: 106 U/L
ALT SERPL-CCNC: 16 U/L
ANION GAP SERPL CALC-SCNC: 14 MMOL/L
AST SERPL-CCNC: 23 U/L
BASOPHILS # BLD AUTO: 0.02 K/UL
BASOPHILS NFR BLD AUTO: 0.5 %
BILIRUB SERPL-MCNC: 0.3 MG/DL
BUN SERPL-MCNC: 14 MG/DL
CALCIUM SERPL-MCNC: 11.1 MG/DL
CHLORIDE SERPL-SCNC: 101 MMOL/L
CHOLEST SERPL-MCNC: 133 MG/DL
CO2 SERPL-SCNC: 25 MMOL/L
CREAT SERPL-MCNC: 0.9 MG/DL
EGFRCR SERPLBLD CKD-EPI 2021: 67 ML/MIN/1.73M2
EOSINOPHIL # BLD AUTO: 0.12 K/UL
EOSINOPHIL NFR BLD AUTO: 2.8 %
ESTIMATED AVERAGE GLUCOSE: 140 MG/DL
GLUCOSE SERPL-MCNC: 101 MG/DL
HBA1C MFR BLD HPLC: 6.5 %
HCT VFR BLD CALC: 44 %
HDLC SERPL-MCNC: 49 MG/DL
HGB BLD-MCNC: 14.3 G/DL
IMM GRANULOCYTES NFR BLD AUTO: 0 %
LDLC SERPL-MCNC: 59 MG/DL
LYMPHOCYTES # BLD AUTO: 1.6 K/UL
LYMPHOCYTES NFR BLD AUTO: 37.1 %
MAN DIFF?: NORMAL
MCHC RBC-ENTMCNC: 31.3 PG
MCHC RBC-ENTMCNC: 32.5 G/DL
MCV RBC AUTO: 96.3 FL
MONOCYTES # BLD AUTO: 0.39 K/UL
MONOCYTES NFR BLD AUTO: 9 %
NEUTROPHILS # BLD AUTO: 2.18 K/UL
NEUTROPHILS NFR BLD AUTO: 50.6 %
NONHDLC SERPL-MCNC: 84 MG/DL
PLATELET # BLD AUTO: 318 K/UL
POTASSIUM SERPL-SCNC: 4.6 MMOL/L
PROT SERPL-MCNC: 7.4 G/DL
RBC # BLD: 4.57 M/UL
RBC # FLD: 14.4 %
SODIUM SERPL-SCNC: 141 MMOL/L
T3FREE SERPL-MCNC: 2.86 PG/ML
T4 FREE SERPL-MCNC: 1.2 NG/DL
TRIGL SERPL-MCNC: 145 MG/DL
TSH SERPL-ACNC: 0.96 UIU/ML
WBC # FLD AUTO: 4.31 K/UL

## 2025-05-20 ENCOUNTER — APPOINTMENT (OUTPATIENT)
Dept: HEART AND VASCULAR | Facility: CLINIC | Age: 75
End: 2025-05-20

## 2025-09-15 ENCOUNTER — NON-APPOINTMENT (OUTPATIENT)
Age: 75
End: 2025-09-15

## 2025-09-17 ENCOUNTER — APPOINTMENT (OUTPATIENT)
Dept: HEART AND VASCULAR | Facility: CLINIC | Age: 75
End: 2025-09-17
Payer: MEDICARE

## 2025-09-17 VITALS — HEART RATE: 61 BPM | DIASTOLIC BLOOD PRESSURE: 58 MMHG | SYSTOLIC BLOOD PRESSURE: 96 MMHG

## 2025-09-17 VITALS — BODY MASS INDEX: 28.93 KG/M2 | HEIGHT: 66 IN | WEIGHT: 180 LBS | TEMPERATURE: 97.6 F

## 2025-09-17 DIAGNOSIS — I48.91 UNSPECIFIED ATRIAL FIBRILLATION: ICD-10-CM

## 2025-09-17 PROCEDURE — 93000 ELECTROCARDIOGRAM COMPLETE: CPT
